# Patient Record
Sex: MALE | Race: WHITE | Employment: FULL TIME | URBAN - METROPOLITAN AREA
[De-identification: names, ages, dates, MRNs, and addresses within clinical notes are randomized per-mention and may not be internally consistent; named-entity substitution may affect disease eponyms.]

---

## 2020-03-14 ENCOUNTER — APPOINTMENT (OUTPATIENT)
Dept: GENERAL RADIOLOGY | Age: 60
DRG: 247 | End: 2020-03-14
Payer: COMMERCIAL

## 2020-03-14 ENCOUNTER — HOSPITAL ENCOUNTER (INPATIENT)
Age: 60
LOS: 2 days | Discharge: HOME | DRG: 247 | End: 2020-03-16
Attending: EMERGENCY MEDICINE | Admitting: INTERNAL MEDICINE
Payer: COMMERCIAL

## 2020-03-14 LAB
A/G RATIO: 1.1 (ref 1.1–2.2)
ALBUMIN SERPL-MCNC: 4 G/DL (ref 3.4–5)
ALP BLD-CCNC: 58 U/L (ref 40–129)
ALT SERPL-CCNC: 63 U/L (ref 10–40)
ANION GAP SERPL CALCULATED.3IONS-SCNC: 15 MMOL/L (ref 3–16)
APTT: 30.8 SEC (ref 24.2–36.2)
AST SERPL-CCNC: 254 U/L (ref 15–37)
BILIRUB SERPL-MCNC: 0.4 MG/DL (ref 0–1)
BUN BLDV-MCNC: 28 MG/DL (ref 7–20)
CALCIUM SERPL-MCNC: 9.6 MG/DL (ref 8.3–10.6)
CHLORIDE BLD-SCNC: 98 MMOL/L (ref 99–110)
CO2: 22 MMOL/L (ref 21–32)
CREAT SERPL-MCNC: 1.4 MG/DL (ref 0.9–1.3)
GFR AFRICAN AMERICAN: >60
GFR NON-AFRICAN AMERICAN: 52
GLOBULIN: 3.6 G/DL
GLUCOSE BLD-MCNC: 250 MG/DL (ref 70–99)
HCT VFR BLD CALC: 48.4 % (ref 40.5–52.5)
HEMOGLOBIN: 16.2 G/DL (ref 13.5–17.5)
INR BLD: 1.01 (ref 0.86–1.14)
LEFT VENTRICULAR EJECTION FRACTION HIGH VALUE: 35 %
MCH RBC QN AUTO: 29.5 PG (ref 26–34)
MCHC RBC AUTO-ENTMCNC: 33.5 G/DL (ref 31–36)
MCV RBC AUTO: 88.2 FL (ref 80–100)
PDW BLD-RTO: 14.7 % (ref 12.4–15.4)
PLATELET # BLD: 153 K/UL (ref 135–450)
PMV BLD AUTO: 8.3 FL (ref 5–10.5)
POTASSIUM REFLEX MAGNESIUM: 4.5 MMOL/L (ref 3.5–5.1)
PRO-BNP: 1278 PG/ML (ref 0–124)
PROTHROMBIN TIME: 11.7 SEC (ref 10–13.2)
RBC # BLD: 5.49 M/UL (ref 4.2–5.9)
SODIUM BLD-SCNC: 135 MMOL/L (ref 136–145)
TOTAL PROTEIN: 7.6 G/DL (ref 6.4–8.2)
TROPONIN: 2.61 NG/ML
WBC # BLD: 10.6 K/UL (ref 4–11)

## 2020-03-14 PROCEDURE — 85730 THROMBOPLASTIN TIME PARTIAL: CPT

## 2020-03-14 PROCEDURE — 80053 COMPREHEN METABOLIC PANEL: CPT

## 2020-03-14 PROCEDURE — C1887 CATHETER, GUIDING: HCPCS

## 2020-03-14 PROCEDURE — 84484 ASSAY OF TROPONIN QUANT: CPT

## 2020-03-14 PROCEDURE — 6370000000 HC RX 637 (ALT 250 FOR IP): Performed by: EMERGENCY MEDICINE

## 2020-03-14 PROCEDURE — 71045 X-RAY EXAM CHEST 1 VIEW: CPT

## 2020-03-14 PROCEDURE — 83880 ASSAY OF NATRIURETIC PEPTIDE: CPT

## 2020-03-14 PROCEDURE — 99152 MOD SED SAME PHYS/QHP 5/>YRS: CPT

## 2020-03-14 PROCEDURE — 93458 L HRT ARTERY/VENTRICLE ANGIO: CPT

## 2020-03-14 PROCEDURE — 92978 ENDOLUMINL IVUS OCT C 1ST: CPT

## 2020-03-14 PROCEDURE — 99153 MOD SED SAME PHYS/QHP EA: CPT

## 2020-03-14 PROCEDURE — C1753 CATH, INTRAVAS ULTRASOUND: HCPCS

## 2020-03-14 PROCEDURE — 99291 CRITICAL CARE FIRST HOUR: CPT | Performed by: INTERNAL MEDICINE

## 2020-03-14 PROCEDURE — 6360000002 HC RX W HCPCS: Performed by: INTERNAL MEDICINE

## 2020-03-14 PROCEDURE — 2500000003 HC RX 250 WO HCPCS

## 2020-03-14 PROCEDURE — C1769 GUIDE WIRE: HCPCS

## 2020-03-14 PROCEDURE — 6360000002 HC RX W HCPCS: Performed by: EMERGENCY MEDICINE

## 2020-03-14 PROCEDURE — 99291 CRITICAL CARE FIRST HOUR: CPT

## 2020-03-14 PROCEDURE — 92941 PRQ TRLML REVSC TOT OCCL AMI: CPT

## 2020-03-14 PROCEDURE — 93005 ELECTROCARDIOGRAM TRACING: CPT | Performed by: INTERNAL MEDICINE

## 2020-03-14 PROCEDURE — 6360000002 HC RX W HCPCS

## 2020-03-14 PROCEDURE — 85347 COAGULATION TIME ACTIVATED: CPT

## 2020-03-14 PROCEDURE — 2709999900 HC NON-CHARGEABLE SUPPLY

## 2020-03-14 PROCEDURE — 85027 COMPLETE CBC AUTOMATED: CPT

## 2020-03-14 PROCEDURE — 36415 COLL VENOUS BLD VENIPUNCTURE: CPT

## 2020-03-14 PROCEDURE — 2100000000 HC CCU R&B

## 2020-03-14 PROCEDURE — C1894 INTRO/SHEATH, NON-LASER: HCPCS

## 2020-03-14 PROCEDURE — C1725 CATH, TRANSLUMIN NON-LASER: HCPCS

## 2020-03-14 PROCEDURE — 85610 PROTHROMBIN TIME: CPT

## 2020-03-14 PROCEDURE — C1874 STENT, COATED/COV W/DEL SYS: HCPCS

## 2020-03-14 RX ORDER — HEPARIN SODIUM 1000 [USP'U]/ML
INJECTION, SOLUTION INTRAVENOUS; SUBCUTANEOUS
Status: COMPLETED | OUTPATIENT
Start: 2020-03-14 | End: 2020-03-14

## 2020-03-14 RX ORDER — HEPARIN SODIUM 1000 [USP'U]/ML
1000 INJECTION, SOLUTION INTRAVENOUS; SUBCUTANEOUS ONCE
Status: DISCONTINUED | OUTPATIENT
Start: 2020-03-14 | End: 2020-03-14

## 2020-03-14 RX ORDER — HEPARIN SODIUM 10000 [USP'U]/100ML
1000 INJECTION, SOLUTION INTRAVENOUS CONTINUOUS
Status: DISCONTINUED | OUTPATIENT
Start: 2020-03-14 | End: 2020-03-15

## 2020-03-14 RX ORDER — FENTANYL CITRATE 50 UG/ML
INJECTION, SOLUTION INTRAMUSCULAR; INTRAVENOUS
Status: COMPLETED | OUTPATIENT
Start: 2020-03-14 | End: 2020-03-14

## 2020-03-14 RX ORDER — HEPARIN SODIUM 10000 [USP'U]/100ML
INJECTION, SOLUTION INTRAVENOUS
Status: DISCONTINUED
Start: 2020-03-14 | End: 2020-03-15

## 2020-03-14 RX ORDER — HEPARIN SODIUM 1000 [USP'U]/ML
2000 INJECTION, SOLUTION INTRAVENOUS; SUBCUTANEOUS PRN
Status: DISCONTINUED | OUTPATIENT
Start: 2020-03-14 | End: 2020-03-15

## 2020-03-14 RX ORDER — SODIUM CHLORIDE 9 MG/ML
INJECTION, SOLUTION INTRAVENOUS
Status: DISPENSED
Start: 2020-03-14 | End: 2020-03-15

## 2020-03-14 RX ORDER — HEPARIN SODIUM 1000 [USP'U]/ML
4000 INJECTION, SOLUTION INTRAVENOUS; SUBCUTANEOUS PRN
Status: DISCONTINUED | OUTPATIENT
Start: 2020-03-14 | End: 2020-03-15

## 2020-03-14 RX ORDER — HEPARIN SODIUM 1000 [USP'U]/ML
4000 INJECTION, SOLUTION INTRAVENOUS; SUBCUTANEOUS ONCE
Status: COMPLETED | OUTPATIENT
Start: 2020-03-14 | End: 2020-03-14

## 2020-03-14 RX ORDER — MIDAZOLAM HYDROCHLORIDE 5 MG/ML
INJECTION INTRAMUSCULAR; INTRAVENOUS
Status: COMPLETED | OUTPATIENT
Start: 2020-03-14 | End: 2020-03-14

## 2020-03-14 RX ORDER — HEPARIN SODIUM 1000 [USP'U]/ML
1000 INJECTION, SOLUTION INTRAVENOUS; SUBCUTANEOUS ONCE
Status: COMPLETED | OUTPATIENT
Start: 2020-03-14 | End: 2020-03-14

## 2020-03-14 RX ADMIN — HEPARIN SODIUM 1000 UNITS: 1000 INJECTION INTRAVENOUS; SUBCUTANEOUS at 22:40

## 2020-03-14 RX ADMIN — FENTANYL CITRATE 50 MCG: 50 INJECTION INTRAMUSCULAR; INTRAVENOUS at 23:27

## 2020-03-14 RX ADMIN — HEPARIN SODIUM AND DEXTROSE 1000 UNITS/HR: 10000; 5 INJECTION INTRAVENOUS at 22:44

## 2020-03-14 RX ADMIN — TIROFIBAN 0.15 MCG/KG/MIN: 5 INJECTION, SOLUTION INTRAVENOUS at 23:40

## 2020-03-14 RX ADMIN — MIDAZOLAM HYDROCHLORIDE 2 MG: 5 INJECTION, SOLUTION INTRAMUSCULAR; INTRAVENOUS at 23:30

## 2020-03-14 RX ADMIN — HEPARIN SODIUM 6800 UNITS: 1000 INJECTION, SOLUTION INTRAVENOUS; SUBCUTANEOUS at 23:30

## 2020-03-14 RX ADMIN — TICAGRELOR 180 MG: 90 TABLET ORAL at 20:58

## 2020-03-14 RX ADMIN — HEPARIN SODIUM 4000 UNITS: 1000 INJECTION, SOLUTION INTRAVENOUS; SUBCUTANEOUS at 21:00

## 2020-03-14 ASSESSMENT — PAIN SCALES - GENERAL: PAINLEVEL_OUTOF10: 2

## 2020-03-14 ASSESSMENT — PAIN DESCRIPTION - PAIN TYPE: TYPE: ACUTE PAIN

## 2020-03-14 ASSESSMENT — PAIN DESCRIPTION - LOCATION: LOCATION: CHEST

## 2020-03-15 PROBLEM — I25.119 CORONARY ARTERY DISEASE INVOLVING NATIVE CORONARY ARTERY OF NATIVE HEART WITH ANGINA PECTORIS (HCC): Status: ACTIVE | Noted: 2020-03-15

## 2020-03-15 LAB
EKG ATRIAL RATE: 104 BPM
EKG DIAGNOSIS: NORMAL
EKG P AXIS: 46 DEGREES
EKG P-R INTERVAL: 168 MS
EKG Q-T INTERVAL: 310 MS
EKG QRS DURATION: 78 MS
EKG QTC CALCULATION (BAZETT): 407 MS
EKG R AXIS: -42 DEGREES
EKG T AXIS: 40 DEGREES
EKG VENTRICULAR RATE: 104 BPM
GLUCOSE BLD-MCNC: 274 MG/DL (ref 70–99)
GLUCOSE BLD-MCNC: 299 MG/DL (ref 70–99)
GLUCOSE BLD-MCNC: 308 MG/DL (ref 70–99)
HCT VFR BLD CALC: 43.5 % (ref 40.5–52.5)
HEMOGLOBIN: 14.4 G/DL (ref 13.5–17.5)
MCH RBC QN AUTO: 29 PG (ref 26–34)
MCHC RBC AUTO-ENTMCNC: 33.2 G/DL (ref 31–36)
MCV RBC AUTO: 87.5 FL (ref 80–100)
PDW BLD-RTO: 14.8 % (ref 12.4–15.4)
PERFORMED ON: ABNORMAL
PLATELET # BLD: 148 K/UL (ref 135–450)
PMV BLD AUTO: 8 FL (ref 5–10.5)
RBC # BLD: 4.97 M/UL (ref 4.2–5.9)
WBC # BLD: 11.3 K/UL (ref 4–11)

## 2020-03-15 PROCEDURE — B2151ZZ FLUOROSCOPY OF LEFT HEART USING LOW OSMOLAR CONTRAST: ICD-10-PCS | Performed by: INTERNAL MEDICINE

## 2020-03-15 PROCEDURE — 2580000003 HC RX 258: Performed by: INTERNAL MEDICINE

## 2020-03-15 PROCEDURE — 85347 COAGULATION TIME ACTIVATED: CPT

## 2020-03-15 PROCEDURE — 36415 COLL VENOUS BLD VENIPUNCTURE: CPT

## 2020-03-15 PROCEDURE — 83036 HEMOGLOBIN GLYCOSYLATED A1C: CPT

## 2020-03-15 PROCEDURE — 85027 COMPLETE CBC AUTOMATED: CPT

## 2020-03-15 PROCEDURE — 76937 US GUIDE VASCULAR ACCESS: CPT | Performed by: INTERNAL MEDICINE

## 2020-03-15 PROCEDURE — 92941 PRQ TRLML REVSC TOT OCCL AMI: CPT | Performed by: INTERNAL MEDICINE

## 2020-03-15 PROCEDURE — 93010 ELECTROCARDIOGRAM REPORT: CPT | Performed by: INTERNAL MEDICINE

## 2020-03-15 PROCEDURE — 99233 SBSQ HOSP IP/OBS HIGH 50: CPT | Performed by: INTERNAL MEDICINE

## 2020-03-15 PROCEDURE — 6360000004 HC RX CONTRAST MEDICATION

## 2020-03-15 PROCEDURE — 6370000000 HC RX 637 (ALT 250 FOR IP): Performed by: INTERNAL MEDICINE

## 2020-03-15 PROCEDURE — 93458 L HRT ARTERY/VENTRICLE ANGIO: CPT | Performed by: INTERNAL MEDICINE

## 2020-03-15 PROCEDURE — B2111ZZ FLUOROSCOPY OF MULTIPLE CORONARY ARTERIES USING LOW OSMOLAR CONTRAST: ICD-10-PCS | Performed by: INTERNAL MEDICINE

## 2020-03-15 PROCEDURE — 027135Z DILATION OF CORONARY ARTERY, TWO ARTERIES WITH TWO DRUG-ELUTING INTRALUMINAL DEVICES, PERCUTANEOUS APPROACH: ICD-10-PCS | Performed by: INTERNAL MEDICINE

## 2020-03-15 PROCEDURE — 92978 ENDOLUMINL IVUS OCT C 1ST: CPT | Performed by: INTERNAL MEDICINE

## 2020-03-15 PROCEDURE — 2100000000 HC CCU R&B

## 2020-03-15 PROCEDURE — 4A023N7 MEASUREMENT OF CARDIAC SAMPLING AND PRESSURE, LEFT HEART, PERCUTANEOUS APPROACH: ICD-10-PCS | Performed by: INTERNAL MEDICINE

## 2020-03-15 PROCEDURE — 6360000002 HC RX W HCPCS

## 2020-03-15 RX ORDER — ASPIRIN 81 MG/1
81 TABLET, CHEWABLE ORAL DAILY
Status: DISCONTINUED | OUTPATIENT
Start: 2020-03-16 | End: 2020-03-16 | Stop reason: HOSPADM

## 2020-03-15 RX ORDER — ACETAMINOPHEN 325 MG/1
650 TABLET ORAL EVERY 4 HOURS PRN
Status: DISCONTINUED | OUTPATIENT
Start: 2020-03-15 | End: 2020-03-16 | Stop reason: HOSPADM

## 2020-03-15 RX ORDER — CHLORAL HYDRATE 500 MG
1000 CAPSULE ORAL DAILY
Status: ON HOLD | COMMUNITY
Start: 2012-12-13 | End: 2020-03-16 | Stop reason: CLARIF

## 2020-03-15 RX ORDER — MIDAZOLAM HYDROCHLORIDE 5 MG/ML
INJECTION INTRAMUSCULAR; INTRAVENOUS
Status: COMPLETED | OUTPATIENT
Start: 2020-03-15 | End: 2020-03-15

## 2020-03-15 RX ORDER — SODIUM CHLORIDE 0.9 % (FLUSH) 0.9 %
10 SYRINGE (ML) INJECTION PRN
Status: DISCONTINUED | OUTPATIENT
Start: 2020-03-15 | End: 2020-03-16 | Stop reason: HOSPADM

## 2020-03-15 RX ORDER — HEPARIN SODIUM 1000 [USP'U]/ML
INJECTION, SOLUTION INTRAVENOUS; SUBCUTANEOUS
Status: COMPLETED | OUTPATIENT
Start: 2020-03-15 | End: 2020-03-15

## 2020-03-15 RX ORDER — LISINOPRIL 2.5 MG/1
2.5 TABLET ORAL DAILY
Status: DISCONTINUED | OUTPATIENT
Start: 2020-03-15 | End: 2020-03-16 | Stop reason: HOSPADM

## 2020-03-15 RX ORDER — FENTANYL CITRATE 50 UG/ML
INJECTION, SOLUTION INTRAMUSCULAR; INTRAVENOUS
Status: COMPLETED | OUTPATIENT
Start: 2020-03-15 | End: 2020-03-15

## 2020-03-15 RX ORDER — METFORMIN HYDROCHLORIDE 500 MG/1
500 TABLET, FILM COATED, EXTENDED RELEASE ORAL 2 TIMES DAILY WITH MEALS
COMMUNITY
Start: 2016-10-03

## 2020-03-15 RX ORDER — DEXTROSE MONOHYDRATE 25 G/50ML
12.5 INJECTION, SOLUTION INTRAVENOUS PRN
Status: DISCONTINUED | OUTPATIENT
Start: 2020-03-15 | End: 2020-03-16 | Stop reason: HOSPADM

## 2020-03-15 RX ORDER — ATORVASTATIN CALCIUM 80 MG/1
80 TABLET, FILM COATED ORAL NIGHTLY
Status: DISCONTINUED | OUTPATIENT
Start: 2020-03-15 | End: 2020-03-16 | Stop reason: HOSPADM

## 2020-03-15 RX ORDER — CARVEDILOL 3.12 MG/1
3.12 TABLET ORAL 2 TIMES DAILY WITH MEALS
Status: DISCONTINUED | OUTPATIENT
Start: 2020-03-15 | End: 2020-03-16 | Stop reason: HOSPADM

## 2020-03-15 RX ORDER — GLUCOSAMINE SULFATE 500 MG
500 CAPSULE ORAL DAILY
COMMUNITY

## 2020-03-15 RX ORDER — ASCORBIC ACID 250 MG
250 TABLET,CHEWABLE ORAL DAILY
COMMUNITY

## 2020-03-15 RX ORDER — BENAZEPRIL HYDROCHLORIDE 20 MG/1
20 TABLET ORAL DAILY
Status: ON HOLD | COMMUNITY
End: 2020-03-16 | Stop reason: HOSPADM

## 2020-03-15 RX ORDER — INSULIN GLARGINE 100 [IU]/ML
0.25 INJECTION, SOLUTION SUBCUTANEOUS NIGHTLY
Status: DISCONTINUED | OUTPATIENT
Start: 2020-03-15 | End: 2020-03-16 | Stop reason: HOSPADM

## 2020-03-15 RX ORDER — ONDANSETRON 2 MG/ML
4 INJECTION INTRAMUSCULAR; INTRAVENOUS EVERY 6 HOURS PRN
Status: DISCONTINUED | OUTPATIENT
Start: 2020-03-15 | End: 2020-03-16 | Stop reason: HOSPADM

## 2020-03-15 RX ORDER — DEXTROSE MONOHYDRATE 50 MG/ML
100 INJECTION, SOLUTION INTRAVENOUS PRN
Status: DISCONTINUED | OUTPATIENT
Start: 2020-03-15 | End: 2020-03-16 | Stop reason: HOSPADM

## 2020-03-15 RX ORDER — SODIUM CHLORIDE 0.9 % (FLUSH) 0.9 %
10 SYRINGE (ML) INJECTION EVERY 12 HOURS SCHEDULED
Status: DISCONTINUED | OUTPATIENT
Start: 2020-03-15 | End: 2020-03-16 | Stop reason: HOSPADM

## 2020-03-15 RX ORDER — NICOTINE POLACRILEX 4 MG
15 LOZENGE BUCCAL PRN
Status: DISCONTINUED | OUTPATIENT
Start: 2020-03-15 | End: 2020-03-16 | Stop reason: HOSPADM

## 2020-03-15 RX ADMIN — ACETAMINOPHEN 650 MG: 325 TABLET ORAL at 16:20

## 2020-03-15 RX ADMIN — Medication 10 ML: at 21:06

## 2020-03-15 RX ADMIN — ACETAMINOPHEN 650 MG: 325 TABLET ORAL at 10:46

## 2020-03-15 RX ADMIN — FENTANYL CITRATE 100 MCG: 50 INJECTION, SOLUTION INTRAMUSCULAR; INTRAVENOUS at 00:17

## 2020-03-15 RX ADMIN — MIDAZOLAM HYDROCHLORIDE 3 MG: 5 INJECTION INTRAMUSCULAR; INTRAVENOUS at 00:17

## 2020-03-15 RX ADMIN — TICAGRELOR 90 MG: 90 TABLET ORAL at 09:10

## 2020-03-15 RX ADMIN — INSULIN LISPRO 8 UNITS: 100 INJECTION, SOLUTION INTRAVENOUS; SUBCUTANEOUS at 11:26

## 2020-03-15 RX ADMIN — INSULIN GLARGINE 28 UNITS: 100 INJECTION, SOLUTION SUBCUTANEOUS at 21:04

## 2020-03-15 RX ADMIN — CARVEDILOL 3.12 MG: 3.12 TABLET, FILM COATED ORAL at 09:10

## 2020-03-15 RX ADMIN — HEPARIN SODIUM 3000 UNITS: 1000 INJECTION, SOLUTION INTRAVENOUS; SUBCUTANEOUS at 00:17

## 2020-03-15 RX ADMIN — INSULIN LISPRO 3 UNITS: 100 INJECTION, SOLUTION INTRAVENOUS; SUBCUTANEOUS at 21:06

## 2020-03-15 RX ADMIN — CARVEDILOL 3.12 MG: 3.12 TABLET, FILM COATED ORAL at 16:20

## 2020-03-15 RX ADMIN — LISINOPRIL 2.5 MG: 2.5 TABLET ORAL at 09:10

## 2020-03-15 RX ADMIN — TICAGRELOR 90 MG: 90 TABLET ORAL at 21:04

## 2020-03-15 RX ADMIN — TICAGRELOR 90 MG: 90 TABLET ORAL at 01:48

## 2020-03-15 RX ADMIN — Medication 10 ML: at 09:13

## 2020-03-15 RX ADMIN — INSULIN LISPRO 6 UNITS: 100 INJECTION, SOLUTION INTRAVENOUS; SUBCUTANEOUS at 16:34

## 2020-03-15 RX ADMIN — ACETAMINOPHEN 650 MG: 325 TABLET ORAL at 21:04

## 2020-03-15 RX ADMIN — ATORVASTATIN CALCIUM 80 MG: 80 TABLET, FILM COATED ORAL at 21:04

## 2020-03-15 RX ADMIN — HEPARIN SODIUM 3000 UNITS: 1000 INJECTION, SOLUTION INTRAVENOUS; SUBCUTANEOUS at 00:36

## 2020-03-15 ASSESSMENT — PAIN SCALES - GENERAL
PAINLEVEL_OUTOF10: 2
PAINLEVEL_OUTOF10: 3
PAINLEVEL_OUTOF10: 2
PAINLEVEL_OUTOF10: 2

## 2020-03-15 NOTE — PROGRESS NOTES
[address]   Progress Note  Cardiology      Chang Neves   Admission date:  3/14/2020  CC-f/up post MI  Subjective:  Ache in chest worse with a breath    Objective:  Medications/Labs all Reviewed     sodium chloride flush  10 mL Intravenous 2 times per day    carvedilol  3.125 mg Oral BID WC    lisinopril  2.5 mg Oral Daily    atorvastatin  80 mg Oral Nightly    [START ON 3/16/2020] aspirin  81 mg Oral Daily    ticagrelor  90 mg Oral BID       BMP:   Lab Results   Component Value Date     03/14/2020    K 4.5 03/14/2020    CL 98 03/14/2020    CO2 22 03/14/2020    BUN 28 03/14/2020    CREATININE 1.4 03/14/2020     CBC:    Lab Results   Component Value Date    WBC 11.3 03/15/2020    RBC 4.97 03/15/2020    HGB 14.4 03/15/2020    HCT 43.5 03/15/2020    MCV 87.5 03/15/2020    RDW 14.8 03/15/2020     03/15/2020      PT/INR:    Lab Results   Component Value Date    INR 1.01 03/14/2020    PROTIME 11.7 03/14/2020     Cardiac Enzymes:    Lab Results   Component Value Date    CKTOTAL 855 06/13/2012     Lab Results   Component Value Date    TROPONINI 2.61 ([facility]) 03/14/2020     BNP:  No results found for: BNP  FASTING LIPID PANEL:    Lab Results   Component Value Date    CHOL 177 06/13/2012    HDL 42 06/13/2012    TRIG 154 06/13/2012       Physical Examination:    /79   Pulse 102   Temp 98.8 °F (37.1 °C) (Oral)   Resp 20   Ht 5' 9\" (1.753 m)   Wt 250 lb (113.4 kg)   SpO2 97%   BMI 36.92 kg/m²      Respiratory:  · Resp Assessment: Normal respiratory effort  · Resp Auscultation: Clear to auscultation bilaterally   Cardiovascular:  · Auscultation: regular rate and rhythm, normal S1S2, no murmur, rub or gallop  · Palpation:  Nl PMI  · JVP:  normal  · Extremities: No Edema  Abdomen:  · Soft, non-tender  · Normal bowel sounds  Extremities:  ·  No Cyanosis or Clubbing  Neurological/Psychiatric:  · Oriented to time, place, and person  · Non-anxious  Skin Warm and dry  Good radial

## 2020-03-15 NOTE — OP NOTE
[address]   Procedure Note      Procedure: [facility]  Indication: Subacute STEMI  Consent: Verbal and/or written consent obtained prior to procedure. Sedation: Minimal conscious sedation utilized for comfort. Complic: None  EBL:  <08GL  Specimens: None  Fluoro:  15.9 min  Contrast: 205 cc  Access:  RRA  Ultrasound: Ultrasound guidance used to determine aforementioned artery patency, size (>2mm), anatomic variations and ideal puncture location. Real-time ultrasound utilized concurrent with vascular needle entry into the artery. Image(s) permanently recorded and reported in the patient chart. Findings:   LM Normal   LAD Ostial 80%, mid 100%   CX 20% mid  RCA 30% mid     LVG 35%, anteroapical hypokinesis   LVEDP 15mmHg    Intervention:   PCI of LAD with 2.5X38 Xience overlapping with more proximal [id number]  Distal PD with 2.75NC, Mid PD with 3.5 and prox PD with 4.0 (IVUS guided)    Post Cath Dx:   Severe 1vd as above    Med Rec:   Recommendation Indicated?  Not Given Due To: Note   DAPT  yes     STATIN - HIGH DOSE yes  Lipitor >40mg, Crestor >20mg   BETA BLOCKER yes     ACE/ARB/ARNI yes     ALDACTONE yes       Non-Med Rec:   Category Recommendation   EF ASSESSMENT Noninvasive assessment of EF if LVG deferred as IP/OP as appropriate   TOBACCO Avoidance of first and second hand smoke   DIET/EXERCISE Maintain healthy lifestyle with focus on diet, exercise and weight loss

## 2020-03-15 NOTE — PLAN OF CARE
Problem: Falls - Risk of:  Goal: Will remain free from falls  Description: Will remain free from falls  Outcome: Met This Shift  Goal: Absence of physical injury  Description: Absence of physical injury  Outcome: Met This Shift     Problem: Cardiac Output - Decreased:  Goal: Hemodynamic stability will improve  Description: Hemodynamic stability will improve  Outcome: Met This Shift     Problem:  Activity:  Goal: Ability to tolerate increased activity will improve  Description: Ability to tolerate increased activity will improve  Outcome: Met This Shift     Problem: Serum Glucose Level - Abnormal:  Goal: Ability to maintain appropriate glucose levels will improve  Description: Ability to maintain appropriate glucose levels will improve  Outcome: Met This Shift     Problem: Sensory Perception - Impaired:  Goal: Ability to maintain a stable neurologic state will improve  Description: Ability to maintain a stable neurologic state will improve  Outcome: Met This Shift     Problem: Pain:  Goal: Pain level will decrease  Description: Pain level will decrease  Outcome: Ongoing  Goal: Control of acute pain  Description: Control of acute pain  Outcome: Ongoing  Goal: Control of chronic pain  Description: Control of chronic pain  Outcome: Ongoing     Problem: Discharge Planning:  Goal: Discharged to appropriate level of care  Description: Discharged to appropriate level of care  Outcome: Ongoing

## 2020-03-15 NOTE — ED NOTES
Bed: 03  Expected date:   Expected time:   Means of arrival:   Comments:  vivian herrera 73 yo male     Susy Scheuermann, RN  03/14/20 2100

## 2020-03-15 NOTE — CONSULTS
[address] Consult/H+P  Interventional Cardiology/Structural Heart Disease    REASON FOR CONSULT/CHIEF COMPLAINT/HPI     RFC/CC cp   HPI Lori Jimenez is a 61 y. o.presents with cp. CP began yesterday at 10am.  Pain substernal, pressure, radiating to back, associated with sob. Pain lasted all day and 8-9/10. Pain much improved today and now 1-2/10. EKG with anterior TOMAS and Qs throughout the precordium. No labs available. HISTORY/ALLERGIES/ROS     MedHx:   has a past medical history of Allergic rhinitis, Arrhythmia, CKD (chronic kidney disease) stage 3, GFR 30-59 ml/min ([facility]), Diverticulitis, Diverticulitis, Fatigue, GERD (gastroesophageal reflux disease), Hyperlipidemia, Hypertension, Microalbuminuria, Osteoarthritis, Plantar fasciitis, Testosterone deficiency, Type 2 diabetes, uncontrolled, with renal manifestation ([address] and Vitamin D deficiency. SurgHx:  has a past surgical history that includes eye surgery; Appendectomy; and shoulder surgery (2006). SocHx:   reports that he has quit smoking. He has never used smokeless tobacco. He reports that he does not drink alcohol or use drugs. FamHx:  @[facility]@  Allergies: Patient has no known allergies. ROS:   [x]Full ROS obtained and negative except as mentioned in HPI    MEDICATIONS      Prior to Admission medications    Medication Sig Start Date End Date Taking? Authorizing Provider   Tuberculin-Allergy Syringes (B-D TB SYRINGE .5CC/27GX1/2\") 27G X 1/2\" 0.5 ML MISC by Does not apply route. 7/17/12   Mauricio Henry MD   testosterone cypionate (DEPOTESTOTERONE CYPIONATE) 200 MG/ML injection Use 100 mg (0.5ml) once a week IM   6/27/12   Mauricio Henry MD   vitamin D (ERGOCALCIFEROL) 60622 UNITS CAPS capsule Take 1 capsule by mouth once a week. 6/26/12 6/26/13  Mauricio Henry MD   fenofibrate micronized (LOFIBRA) 134 MG capsule Take 1 capsule by mouth every morning (before breakfast) for 360 days.  6/13/12 6/8/13 Ce London MD   benazepril (LOTENSIN) 20 MG tablet Take 1 tablet by mouth daily for 360 days. 6/13/12 6/8/13  Ce London MD   LANTUS SOLOSTAR 100 UNIT/ML injection Inject 40 Units into the skin 2 times daily. 3/16/12 3/16/13  Quiana Austin MD   Insulin Pen Needle (B-D ULTRAFINE III SHORT PEN) 31G X 8 MM MISC 1 each 5 times daily. 3/16/12   Quiana Austin MD   simvastatin (ZOCOR) 20 MG tablet Take 1 tablet by mouth nightly. 3/16/12 3/16/13  Quiana Austin MD   omega-3 acid ethyl esters (LOVAZA) 1 G capsule Take 2 capsules by mouth 2 times daily. 3/16/12   Quiana Austin MD   NOVOLOG FLEXPEN 100 UNIT/ML injection Use up to 60 units SQ in divided doses 3/16/12   Quiana Austin MD   S-Wsztyanmvdzf-R13-B6-B2 (CEREFOLIN) 6-1-50-5 MG TABS Take 1 tablet by mouth 3 times daily (with meals). 2/9/12   Quiana Austin MD   fluticasone (FLONASE) 50 MCG/ACT nasal spray 2 sprays by Nasal route as needed. 5/6/11   Historical Provider, MD   hydrocodone-acetaminophen (VICODIN) 5-500 MG per tablet Take 1 tablet by mouth as needed. 5/23/11   Historical Provider, MD   aspirin 81 MG tablet Take 81 mg by mouth daily.       Historical Provider, MD       PHYSICAL EXAM        Vitals:    03/14/20 2101   BP:    Pulse:    Resp:    Temp: 98.3 °F (36.8 °C)   SpO2:     Weight: 250 lb (113.4 kg)     Gen Alert, coop, no distress Heart  Rrr, no mrg   Head NC, AT, no abnorm Abd  Soft, NT, +BS, no mass, no OM   Eyes PERRLA, conj/corn clear Ext  Ext nl, AT, no C/C/E   Nose Nares nl, no drain, NT Pulse 2+ and symmetric   Throat Lips, mucosa, tongue nl Skin Color/text/turg nl, no rash/lesions   Neck S/S, TM, NT, no bruit/JVD Psych Nl mood and affect   Lung CTA-B, unlabored, no DTP Lymph   No cervical or axillary LA   Ch wall NT, no deform Neuro  Nl gross M/S exam     LABS     No labs available    ASSESSMENT AND PLAN     ~STEMI, subacute  Symptom timing and EKG suggests subacute MI more than 36 hours duration. Plan LHC, discussed in detail with patient, will proceed given mild ongoing pain   Load with brilinta and heparin  ~HTN  Controlled  Plan Continue antihypertensives  ~CHOL  On statin  Plan Continue statin    Critical Care   Due to the high probability of clinically significant life threating deterioration of the patient's condition that required my urgent intervention, a total critical care time of >35 minutes was used. This time excludes any time that may have been spent performing procedures. This includes but not limited to vital sign monitoring, telemetry monitoring, continuous pulse oximety, IV medication, clinical response to the IV medications, documentation time , consultation time, interpretation of lab data, review of nursing notes and old record review.      All questions and concerns were addressed to the patient/family. Alternatives to my treatment were discussed. The note was completed using EMR.  Every effort was made to ensure accuracy; however, inadvertent computerized transcription errors may be present.

## 2020-03-15 NOTE — ED PROVIDER NOTES
Inability: Not on file    Transportation needs     Medical: Not on file     Non-medical: Not on file   Tobacco Use    Smoking status: Former Smoker    Smokeless tobacco: Never Used   Substance and Sexual Activity    Alcohol use: No    Drug use: No    Sexual activity: Yes     Partners: Female   Lifestyle    Physical activity     Days per week: Not on file     Minutes per session: Not on file    Stress: Not on file   Relationships    Social connections     Talks on phone: Not on file     Gets together: Not on file     Attends Gnosticism service: Not on file     Active member of club or organization: Not on file     Attends meetings of clubs or organizations: Not on file     Relationship status: Not on file    Intimate partner violence     Fear of current or ex partner: Not on file     Emotionally abused: Not on file     Physically abused: Not on file     Forced sexual activity: Not on file   Other Topics Concern    Not on file   Social History Narrative    Not on file     Current Facility-Administered Medications   Medication Dose Route Frequency Provider Last Rate Last Dose    heparin (porcine) injection 4,000 Units  4,000 Units Intravenous PRN Jacki Santo MD        heparin (porcine) injection 2,000 Units  2,000 Units Intravenous PRN Jacki Santo MD        heparin 25,000 units in dextrose 5% 250 mL infusion  1,000 Units/hr Intravenous Continuous Jacki Santo MD         Current Outpatient Medications   Medication Sig Dispense Refill    Tuberculin-Allergy Syringes (B-D TB SYRINGE .5CC/27GX1/2\") 27G X 1/2\" 0.5 ML MISC by Does not apply route. 24 each 3    testosterone cypionate (DEPOTESTOTERONE CYPIONATE) 200 MG/ML injection Use 100 mg (0.5ml) once a week IM   3 mL 3    vitamin D (ERGOCALCIFEROL) 29140 UNITS CAPS capsule Take 1 capsule by mouth once a week. 12 capsule 3    fenofibrate micronized (LOFIBRA) 134 MG capsule Take 1 capsule by mouth every morning (before breakfast) for 360 days.  80 course, the patient was given:  Medications   heparin (porcine) injection 4,000 Units (has no administration in time range)   heparin (porcine) injection 2,000 Units (has no administration in time range)   heparin 25,000 units in dextrose 5% 250 mL infusion (has no administration in time range)   ticagrelor (BRILINTA) tablet 180 mg (180 mg Oral Given 3/14/20 2058)   heparin (porcine) injection 4,000 Units (4,000 Units Intravenous Given 3/14/20 2100)        All questions were answered and the patient/family expressed understanding and agreement with the plan. PROCEDURES  None    CRITICAL CARE  CRITICAL CARE TIME:  Total critical care time provided today was at least 31 minutes. This excludes seperately billable procedures. Critical care time was provided for STEMI that required close evaluation and/or intervention with concern for potential patient decompensation. CLINICAL IMPRESSION  1. ST elevation myocardial infarction (STEMI), unspecified artery ([facility] [address]        DISPOSITION   Admit / Cath lab    Condition: stable    Dejan Medrano MD    Note: This chart was created using voice recognition dictation software. Efforts were made by me to ensure accuracy, however some errors may be present due to limitations of this technology and occasionally words are not transcribed correctly.        Dejan Medrano MD  03/14/20 [id number]

## 2020-03-16 VITALS
DIASTOLIC BLOOD PRESSURE: 80 MMHG | OXYGEN SATURATION: 98 % | RESPIRATION RATE: 20 BRPM | HEART RATE: 100 BPM | WEIGHT: 250 LBS | BODY MASS INDEX: 37.03 KG/M2 | SYSTOLIC BLOOD PRESSURE: 128 MMHG | TEMPERATURE: 98.2 F | HEIGHT: 69 IN

## 2020-03-16 PROBLEM — I21.3 STEMI (ST ELEVATION MYOCARDIAL INFARCTION) (HCC): Status: ACTIVE | Noted: 2020-03-16

## 2020-03-16 LAB
ANION GAP SERPL CALCULATED.3IONS-SCNC: 13 MMOL/L (ref 3–16)
BUN BLDV-MCNC: 29 MG/DL (ref 7–20)
CALCIUM SERPL-MCNC: 8.8 MG/DL (ref 8.3–10.6)
CHLORIDE BLD-SCNC: 100 MMOL/L (ref 99–110)
CHOLESTEROL, TOTAL: 142 MG/DL (ref 0–199)
CO2: 22 MMOL/L (ref 21–32)
CREAT SERPL-MCNC: 1.3 MG/DL (ref 0.9–1.3)
ESTIMATED AVERAGE GLUCOSE: 197.3 MG/DL
GFR AFRICAN AMERICAN: >60
GFR NON-AFRICAN AMERICAN: 56
GLUCOSE BLD-MCNC: 200 MG/DL (ref 70–99)
GLUCOSE BLD-MCNC: 241 MG/DL (ref 70–99)
GLUCOSE BLD-MCNC: 307 MG/DL (ref 70–99)
GLUCOSE BLD-MCNC: 323 MG/DL (ref 70–99)
HBA1C MFR BLD: 8.5 %
HCT VFR BLD CALC: 40.8 % (ref 40.5–52.5)
HDLC SERPL-MCNC: 47 MG/DL (ref 40–60)
HEMOGLOBIN: 13.7 G/DL (ref 13.5–17.5)
LDL CHOLESTEROL CALCULATED: 65 MG/DL
LV EF: 35 %
LVEF MODALITY: NORMAL
MCH RBC QN AUTO: 29.6 PG (ref 26–34)
MCHC RBC AUTO-ENTMCNC: 33.7 G/DL (ref 31–36)
MCV RBC AUTO: 87.9 FL (ref 80–100)
PDW BLD-RTO: 14.4 % (ref 12.4–15.4)
PERFORMED ON: ABNORMAL
PLATELET # BLD: 134 K/UL (ref 135–450)
PMV BLD AUTO: 8.1 FL (ref 5–10.5)
POTASSIUM SERPL-SCNC: 4.5 MMOL/L (ref 3.5–5.1)
RBC # BLD: 4.64 M/UL (ref 4.2–5.9)
SODIUM BLD-SCNC: 135 MMOL/L (ref 136–145)
TRIGL SERPL-MCNC: 149 MG/DL (ref 0–150)
VLDLC SERPL CALC-MCNC: 30 MG/DL
WBC # BLD: 10.5 K/UL (ref 4–11)

## 2020-03-16 PROCEDURE — 2580000003 HC RX 258: Performed by: INTERNAL MEDICINE

## 2020-03-16 PROCEDURE — 99239 HOSP IP/OBS DSCHRG MGMT >30: CPT | Performed by: NURSE PRACTITIONER

## 2020-03-16 PROCEDURE — 6370000000 HC RX 637 (ALT 250 FOR IP): Performed by: INTERNAL MEDICINE

## 2020-03-16 PROCEDURE — 80048 BASIC METABOLIC PNL TOTAL CA: CPT

## 2020-03-16 PROCEDURE — 80061 LIPID PANEL: CPT

## 2020-03-16 PROCEDURE — C8929 TTE W OR WO FOL WCON,DOPPLER: HCPCS

## 2020-03-16 PROCEDURE — 36415 COLL VENOUS BLD VENIPUNCTURE: CPT

## 2020-03-16 PROCEDURE — 6360000004 HC RX CONTRAST MEDICATION: Performed by: INTERNAL MEDICINE

## 2020-03-16 PROCEDURE — 85027 COMPLETE CBC AUTOMATED: CPT

## 2020-03-16 RX ORDER — ATORVASTATIN CALCIUM 80 MG/1
80 TABLET, FILM COATED ORAL NIGHTLY
Qty: 30 TABLET | Refills: 3 | Status: SHIPPED | OUTPATIENT
Start: 2020-03-16 | End: 2020-04-08 | Stop reason: SDUPTHER

## 2020-03-16 RX ORDER — VALSARTAN 80 MG/1
40 TABLET ORAL DAILY
Qty: 30 TABLET | Refills: 3 | Status: SHIPPED | OUTPATIENT
Start: 2020-03-16 | End: 2020-03-24

## 2020-03-16 RX ORDER — SIMVASTATIN 40 MG
40 TABLET ORAL NIGHTLY
Status: ON HOLD | COMMUNITY
End: 2020-03-16 | Stop reason: HOSPADM

## 2020-03-16 RX ORDER — CARVEDILOL 6.25 MG/1
6.25 TABLET ORAL 2 TIMES DAILY WITH MEALS
Qty: 60 TABLET | Refills: 2 | Status: SHIPPED | OUTPATIENT
Start: 2020-03-16 | End: 2020-04-01

## 2020-03-16 RX ORDER — BENAZEPRIL HYDROCHLORIDE 40 MG/1
40 TABLET, FILM COATED ORAL DAILY
Status: ON HOLD | COMMUNITY
End: 2020-03-16 | Stop reason: HOSPADM

## 2020-03-16 RX ADMIN — TICAGRELOR 90 MG: 90 TABLET ORAL at 08:58

## 2020-03-16 RX ADMIN — CARVEDILOL 3.12 MG: 3.12 TABLET, FILM COATED ORAL at 08:15

## 2020-03-16 RX ADMIN — LISINOPRIL 2.5 MG: 2.5 TABLET ORAL at 08:58

## 2020-03-16 RX ADMIN — INSULIN LISPRO 4 UNITS: 100 INJECTION, SOLUTION INTRAVENOUS; SUBCUTANEOUS at 08:15

## 2020-03-16 RX ADMIN — Medication 10 ML: at 07:47

## 2020-03-16 RX ADMIN — ASPIRIN 81 MG 81 MG: 81 TABLET ORAL at 08:58

## 2020-03-16 RX ADMIN — PERFLUTREN 1.65 MG: 6.52 INJECTION, SUSPENSION INTRAVENOUS at 07:43

## 2020-03-16 RX ADMIN — ACETAMINOPHEN 650 MG: 325 TABLET ORAL at 02:24

## 2020-03-16 RX ADMIN — INSULIN LISPRO 8 UNITS: 100 INJECTION, SOLUTION INTRAVENOUS; SUBCUTANEOUS at 16:04

## 2020-03-16 RX ADMIN — INSULIN LISPRO 8 UNITS: 100 INJECTION, SOLUTION INTRAVENOUS; SUBCUTANEOUS at 12:19

## 2020-03-16 RX ADMIN — ACETAMINOPHEN 650 MG: 325 TABLET ORAL at 09:11

## 2020-03-16 RX ADMIN — MUPIROCIN: 20 OINTMENT TOPICAL at 08:58

## 2020-03-16 ASSESSMENT — PAIN DESCRIPTION - PAIN TYPE: TYPE: ACUTE PAIN

## 2020-03-16 ASSESSMENT — PAIN SCALES - GENERAL
PAINLEVEL_OUTOF10: 1
PAINLEVEL_OUTOF10: 2
PAINLEVEL_OUTOF10: 2
PAINLEVEL_OUTOF10: 1

## 2020-03-16 ASSESSMENT — PAIN DESCRIPTION - LOCATION: LOCATION: CHEST

## 2020-03-16 NOTE — PLAN OF CARE
Pt to remain free of fall and/or physical injury. Bed check on and functioning properly. Safe sign visible at door. Preventative Mepilex in place. No signs or symptoms of skin breakdown/ deep tissue injury. Turn every 2 hours. Pressure reducing interventions for heels.

## 2020-03-16 NOTE — DISCHARGE INSTR - ACTIVITY
Advance your activity as tolerated. No lifting with the right arm until for another 3-4 days. Pace your activity. If you experience chest pain, shortness of breath, or fatigue, please stop and rest.  If the symptom(s) don't resolve within a few minutes, consider seeking direction from your doctor, cardiologist, or available Emergency Response Systems.

## 2020-03-16 NOTE — DISCHARGE SUMMARY
[address]   Daily Progress Note    Admit Date:  3/14/2020  HPI:    Chief Complaint   Patient presents with    Chest Pain     CP since yesterday, went away and came back, nausea intermittent         Interval history: Charis Cazares is being followed for STEMI. Admitted 3/14/20 emergently to the cath lab for sub-acute STEMI. S/P DEONTE to LAD x2.  Echo showed LVEF 35%. Subjective:  Mr. Heather Law feels good. Ambulating in the hallways. No arrhythmias noted during admission   No chest pain. No shortness of breath.      Objective:   /74   Pulse 115   Temp 98.8 °F (37.1 °C)   Resp 18   Ht 5' 9\" (1.753 m)   Wt 250 lb (113.4 kg)   SpO2 97%   BMI 36.92 kg/m²       Intake/Output Summary (Last 24 hours) at 3/16/2020 1327  Last data filed at 3/15/2020 1810  Gross per 24 hour   Intake 240 ml   Output --   Net 240 ml       NYHA: II    Physical Exam:  General:  Awake, alert, NAD  Skin:  Warm and dry  Neck:  JVD<8  Chest:  Clear to auscultation, no wheezes/rhonchi/rales  Cardiovascular:  Tachy rate, reg rhtyhm, S1S2, no m/r/g   Abdomen:  Soft, nontender, +bowel sounds  Extremities:  No ble  Edema, right wrist without     Medications:    mupirocin   Nasal BID    sodium chloride flush  10 mL Intravenous 2 times per day    carvedilol  3.125 mg Oral BID WC    lisinopril  2.5 mg Oral Daily    atorvastatin  80 mg Oral Nightly    aspirin  81 mg Oral Daily    ticagrelor  90 mg Oral BID    insulin glargine  0.25 Units/kg Subcutaneous Nightly    insulin lispro  0-12 Units Subcutaneous TID WC    insulin lispro  0-6 Units Subcutaneous Nightly      dextrose         Lab Data:  CBC:   Recent Labs     03/14/20  2057 03/15/20  0635 03/16/20 0417   WBC 10.6 11.3* 10.5   HGB 16.2 14.4 13.7    148 134*     BMP:    Recent Labs     03/14/20 2057 03/16/20 0417   * 135*   K 4.5 4.5   CO2 22 22   BUN 28* 29*   CREATININE 1.4* 1.3     INR:    Recent Labs     03/14/20 2057   INR 1.01     BNP: Recent Labs     03/14/20 2057   PROBNP 1,278*     No results found for: LVEF, LVEFMODE    Cardiac cath 3/15/20  Findings:               LM         Normal     LAD       Ostial 80%, mid 100%         CX         20% mid  RCA       30% mid                                               LVG       35%, anteroapical hypokinesis           LVEDP  15mmHg     Intervention:         PCI of LAD with 2.5X38 Xience overlapping with more proximal [id number]  Distal PD with 2.75NC, Mid PD with 3.5 and prox PD with 4.0 (IVUS guided)      [facility] 3/16/20   Summary   The left ventricular systolic function is moderately reduced with an   ejection fraction of 35 %. Hypokinesis of apical inferior,apical   septal,apical anterior and mid anteroseptal walls. Normal left ventricular   size with mild concentric left ventricular hypertrophy.   Grade I diastolic dysfunction with normal filling pressure.   Mild thickening of leaflets of mitral valve.   No mitral stenosis.   Mild mitral regurgitation.   No intracardiac thrombus. Definity echo contrast was used. Principal Problem:    ST elevation myocardial infarction (STEMI) ([address]  Active Problems:    Type 2 diabetes, uncontrolled, with renal manifestation (HCC)    Hyperlipidemia    Coronary artery disease involving native coronary artery of native heart with angina pectoris ([address]  Resolved Problems:    * No resolved hospital problems.  *      Assessment/Plan:  ~STEMI; s/p DEONTE x2 in LAD  ~HLD  ~HTN  ~CAD  ~Ischemic cardiomyopathy  ~DM    Plan:  Aspirin and brilinta  D/c lininopril and start valsartan 40mg daily in place of home benazepril   Adjust coreg 6.25mg BID  Continue home diabetic meds including home farxiga   Continue lipitor 80mg daily     Okay for discharge; off of work for the next 2 weeks   Follow up in 2 weeks with repeat labs    Required to place admit order at the time of discharge due to unable to complete discharge medication rec; therefore the time of admission on the admit order and the discharge order will appear the same.      Condition at discharge: good  Discharged to: [id number] Frank CNP, 3/16/2020, 3:25 PM

## 2020-03-16 NOTE — PROGRESS NOTES
Pt discharged to home with pt's spouse. Reviewed discharge paperwork and medications with pt and spouse at bedside. Pt verbalized understanding of same along with scheduled follow up appointment. Pt to POV via wheelchair without event.

## 2020-03-16 NOTE — CARE COORDINATION
CM meeting with patient at bedside when discharge orders received. Patient normally independent and denies needs.  Patient has been ambulating freely in halls   [facility], [facility]

## 2020-03-16 NOTE — PLAN OF CARE
Problem: Falls - Risk of:  Goal: Will remain free from falls  Description: Will remain free from falls  3/16/2020 1130 by Artis Liao RN  Outcome: Ongoing    Problem: Pain:  Goal: Control of acute pain  Description: Control of acute pain  3/16/2020 1130 by Artis Liao RN  Outcome: Ongoing     Problem: Cardiac Output - Decreased:  Goal: Hemodynamic stability will improve  Description: Hemodynamic stability will improve  3/16/2020 1130 by Artis Liao RN  Outcome: Ongoing     Problem:  Activity:  Goal: Ability to tolerate increased activity will improve  Description: Ability to tolerate increased activity will improve  3/16/2020 1130 by Artis Liao RN  Outcome: Ongoing     Problem: Discharge Planning:  Goal: Discharged to appropriate level of care  Description: Discharged to appropriate level of care  3/16/2020 1130 by Artis Liao RN  Outcome: Ongoing     Problem: Serum Glucose Level - Abnormal:  Goal: Ability to maintain appropriate glucose levels will improve  Description: Ability to maintain appropriate glucose levels will improve  3/16/2020 1130 by Artis Liao RN  Outcome: Ongoing      Problem: Pain:  Goal: Control of acute pain  Description: Control of acute pain  3/16/2020 1130 by Artis Liao RN  Outcome: Ongoing

## 2020-03-17 LAB
POC ACT LR: 151 SEC
POC ACT LR: 224 SEC
POC ACT LR: 226 SEC

## 2020-03-24 ENCOUNTER — TELEPHONE (OUTPATIENT)
Dept: CARDIOLOGY | Age: 60
End: 2020-03-24

## 2020-03-24 ENCOUNTER — OFFICE VISIT (OUTPATIENT)
Dept: CARDIOLOGY CLINIC | Age: 60
End: 2020-03-24
Payer: COMMERCIAL

## 2020-03-24 VITALS
HEART RATE: 87 BPM | SYSTOLIC BLOOD PRESSURE: 110 MMHG | DIASTOLIC BLOOD PRESSURE: 76 MMHG | OXYGEN SATURATION: 96 % | HEIGHT: 69 IN | WEIGHT: 260 LBS | BODY MASS INDEX: 38.51 KG/M2

## 2020-03-24 PROCEDURE — 99215 OFFICE O/P EST HI 40 MIN: CPT | Performed by: NURSE PRACTITIONER

## 2020-03-24 PROCEDURE — 93000 ELECTROCARDIOGRAM COMPLETE: CPT | Performed by: NURSE PRACTITIONER

## 2020-03-24 RX ORDER — VALSARTAN 80 MG/1
80 TABLET ORAL DAILY
Qty: 30 TABLET | Refills: 3 | Status: SHIPPED | OUTPATIENT
Start: 2020-03-24 | End: 2020-06-01

## 2020-03-24 RX ORDER — FUROSEMIDE 20 MG/1
20 TABLET ORAL DAILY
Qty: 90 TABLET | Refills: 1 | Status: SHIPPED | OUTPATIENT
Start: 2020-03-24 | End: 2020-04-14 | Stop reason: SDUPTHER

## 2020-03-24 NOTE — LETTER
[facility]   Cardiac Follow-up    Primary Care Doctor:  [facility]ee[facility] - [facility]    Chief Complaint   Patient presents with    Follow-Up from Hospital    Shortness of Breath    Fatigue        History of Present Illness:   I had the pleasure of seeing Angelito Mason in follow up for Hospital follow up. Recently admitted 3/14/20-3/16/20 with STEMI. S/p DEONTE to LAD x2. Echocardiogram shows LVEF down to 35%. Started on Valsartan 40mg daily, coreg adjusted to 6.25mg BID and discharged on Brilinta and aspirin. Patient called today complaining of worsening shortness of breath. He has been having progressively worsening shortness of breath with trying to sleep. getting orthopnea, improves with sitting up. Having to sleep in a recliner- new for him. Feels like he is smothering and someone has closed up his wind pipe. Breathing doesn't improve with pursed lip breathing. Also coughing, dry to clear sputum at times. He has trying to be acting and Walking until he gets shortness of breath. Was able to was 3/4 of a mile yesterday. Not using as much Pepcid AC as before, no chest pain. Angelito Mason describes symptoms including dyspnea, orthopnea but denies chest pain, palpitations, edema, syncope. Home weights: doesn't have a scale    Past Medical History:   has a past medical history of Allergic rhinitis, Arrhythmia, CKD (chronic kidney disease) stage 3, GFR 30-59 ml/min ([facility]), Coronary artery disease involving native coronary artery of native heart with angina pectoris ([address] Diverticulitis, Diverticulitis, Fatigue, GERD (gastroesophageal reflux disease), Hyperlipidemia, Hypertension, Microalbuminuria, Osteoarthritis, Plantar fasciitis, Testosterone deficiency, Type 2 diabetes, uncontrolled, with renal manifestation ([address] and Vitamin D deficiency. Surgical History:   has a past surgical history that includes eye surgery; Appendectomy; and shoulder surgery (2006).    Social History: Recent Testing:  Cardiac cath 3/15/20  Findings:               LM         Normal     LAD       Ostial 80%, mid 100%         CX         20% mid  RCA       30% mid                                               LVG       35%, anteroapical hypokinesis           LVEDP  15mmHg     Intervention:         PCI of LAD with 2.5X38 Xience overlapping with more proximal [id number]  Distal PD with 2.75NC, Mid PD with 3.5 and prox PD with 4.0 (IVUS guided)        [facility] 3/16/20   Summary   The left ventricular systolic function is moderately reduced with an   ejection fraction of 35 %. Hypokinesis of apical inferior,apical   septal,apical anterior and mid anteroseptal walls. Normal left ventricular   size with mild concentric left ventricular hypertrophy.   Grade I diastolic dysfunction with normal filling pressure.   Mild thickening of leaflets of mitral valve.   No mitral stenosis.   Mild mitral regurgitation.   No intracardiac thrombus. Definity echo contrast was used.       NYHA:   III  ACC/ AHA Stage:    C    Pertinent Problems:  ~shortness of breath, orthopnea- likely due to pulmonary edema/pleural effusions from heart failure  ~Acute systolic heart failure. Recent STEMI, and drop in LVEF 35%. ~STEMI; s/p DEONTE x2 in LAD  ~HLD  ~HTN  ~CAD  ~Ischemic cardiomyopathy  ~DM       Visit Diagnosis:    1. Acute systolic heart failure ([address]    2. Pure hypercholesterolemia    3. Essential hypertension    4. Ischemic cardiomyopathy    5. Shortness of breath        Plan:   1. Obtain a scale,- check daily weights- 1st thing in the am after you go to the bathroom before you eat breakfast. Record your weights, if you gain 3lbs in a day or 5lbs in a week- call the office  2. Start you on lasix 20mg daily for now (water pill) may need to increase dose to 40mg if weight is increasing and more short of breath.    3. Adjust fluid intake to a goal of about 2L a day or 64 ounces a day- keep it consistent from day to day 4. Recommend you stay off of work until 4/6/2020  5. Check BMP and BNP next week - to monitor potassium level with medication changes. 6. Adjust valsartan to 80mg daily - this is for your heart failure  7. Check b/p at home if you experience lightheadedness or dizziness  8. Continue brilinta, aspirin, brilinta   9. Reviewed exercise   10. Follow up next week     QUALITY MEASURES  1. Tobacco Cessation Counseling: NA  2. Retake of BP if >140/90:   NA  3. Documentation to PCP/referring for new patient:  Sent to PCP at close of office visit  4. CAD patient on anti-platelet: Yes  5. CAD patient on STATIN therapy:  Yes  6. Patient with CHF and aFib on anticoagulation:  NA     I appreciate the opportunity for caring for this patient.      Kamini Ramon CNP, 3/24/2020, 3:05 PM

## 2020-03-24 NOTE — PATIENT INSTRUCTIONS
1. Obtain a scale,- check daily weights- 1st thing in the am after you go to the bathroom before you eat breakfast. Record your weights, if you gain 3lbs in a day or 5lbs in a week- call the office  2. Start you on lasix 20mg daily for now (water pill) may need to increase dose to 40mg if weight is increasing and more short of breath. 3. Adjust fluid intake to a goal of about 2L a day or 64 ounces a day- keep it consistent from day to day   4. Recommend you stay off of work until 4/6/2020   5. Check BMP and BNP next week -   6. Adjust valsartan to 80mg daily - this is for your heart failure  7. Check b/p at home if you experience lightheadedness or dizziness.   8. Follow up next week

## 2020-03-24 NOTE — TELEPHONE ENCOUNTER
Need to see him, please have him come in for appt today.      Also have his [facility] papers completed

## 2020-03-24 NOTE — PROGRESS NOTES
[facility] [place]   Cardiac Follow-up    Primary Care Doctor:  Leonie Mccormick [facility] - [facility]    Chief Complaint   Patient presents with    Follow-Up from Hospital    Shortness of Breath    Fatigue        History of Present Illness:   I had the pleasure of seeing Deniz Grier in follow up for Hospital follow up. Recently admitted 3/14/20-3/16/20 with STEMI. S/p DEONTE to LAD x2. Echocardiogram shows LVEF down to 35%. Started on Valsartan 40mg daily, coreg adjusted to 6.25mg BID and discharged on Brilinta and aspirin. Patient called today complaining of worsening shortness of breath. He has been having progressively worsening shortness of breath with trying to sleep. getting orthopnea, improves with sitting up. Having to sleep in a recliner- new for him. Feels like he is smothering and someone has closed up his wind pipe. Breathing doesn't improve with pursed lip breathing. Also coughing, dry to clear sputum at times. He has trying to be acting and Walking until he gets shortness of breath. Was able to was 3/4 of a mile yesterday. Not using as much Pepcid AC as before, no chest pain. Deniz Grier describes symptoms including dyspnea, orthopnea but denies chest pain, palpitations, edema, syncope. Home weights: doesn't have a scale    Past Medical History:   has a past medical history of Allergic rhinitis, Arrhythmia, CKD (chronic kidney disease) stage 3, GFR 30-59 ml/min ([facility]), Coronary artery disease involving native coronary artery of native heart with angina pectoris ([address] Diverticulitis, Diverticulitis, Fatigue, GERD (gastroesophageal reflux disease), Hyperlipidemia, Hypertension, Microalbuminuria, Osteoarthritis, Plantar fasciitis, Testosterone deficiency, Type 2 diabetes, uncontrolled, with renal manifestation ([address] and Vitamin D deficiency. Surgical History:   has a past surgical history that includes eye surgery; Appendectomy; and shoulder surgery (2006).    Social History:   reports that he has quit smoking. He has never used smokeless tobacco. He reports that he does not drink alcohol or use drugs. Family History:   Family History   Problem Relation Age of Onset    Glaucoma Mother     Heart Disease Father     Heart Attack Father     Cancer Maternal Grandmother     Cancer Maternal Grandfather        Home Medications:  Prior to Admission medications    Medication Sig Start Date End Date Taking? Authorizing Provider   atorvastatin (LIPITOR) 80 MG tablet Take 1 tablet by mouth nightly 3/16/20   Virgilio Chin APRN - CNP   carvedilol (COREG) 6.25 MG tablet Take 1 tablet by mouth 2 times daily (with meals) 3/16/20   Vigrilio Chin APRN - NAY   ticagrelor [facility]) 90 MG TABS tablet Take 1 tablet by mouth 2 times daily 3/16/20   MONA Canela - CNP   dapagliflozin [facility]) 5 MG tablet Take 1 tablet by mouth every morning 3/16/20   Virgilio Chin APRN - CNP   valsartan (DIOVAN) 80 MG tablet Take 0.5 tablets by mouth daily 3/16/20   Virgilio Chin APRN - CNP   insulin lispro protamine & lispro (HUMALOG MIX) (75-25) 100 UNIT per ML SUSP injection vial Inject 80 Units into the skin 2 times daily (with meals)    Historical Provider, MD   insulin lispro (HUMALOG) 100 UNIT/ML injection vial Inject 15 Units into the skin 3 times daily (before meals)    Historical Provider, MD   metFORMIN, MOD, (GLUMETZA) 500 MG extended release tablet Take 500 mg by mouth 2 times daily (with meals)  10/3/16   Historical Provider, MD   Ascorbic Acid (VITAMIN C) 250 MG CHEW Take 250 mg by mouth daily    Historical Provider, MD   Glucosamine 500 MG CAPS Take 500 mg by mouth daily    Historical Provider, MD   fenofibrate micronized (LOFIBRA) 134 MG capsule Take 1 capsule by mouth every morning (before breakfast) for 360 days. 6/13/12 6/8/13  Markus Broderick MD   Insulin Pen Needle (B-D ULTRAFINE III SHORT PEN) 31G X 8 MM MISC 1 each 5 times daily.  3/16/12   Rose Rosen MD   omega-3 acid ethyl Abnormalities Noted  Musculoskeletal/Skin:  · Exhibits normal gait balance and coordination  · There is no clubbing, cyanosis of the extremities  · Skin is warm and dry  · Moves all extremities well  Neurological/Psychiatric:  · Alert and oriented in all spheres  · No abnormalities of mood, affect, memory, mentation, or behavior are noted    Lab Data:  CBC:   Lab Results   Component Value Date    WBC 10.5 03/16/2020    WBC 11.3 03/15/2020    WBC 10.6 03/14/2020    RBC 4.64 03/16/2020    RBC 4.97 03/15/2020    RBC 5.49 03/14/2020    HGB 13.7 03/16/2020    HGB 14.4 03/15/2020    HGB 16.2 03/14/2020    HCT 40.8 03/16/2020    HCT 43.5 03/15/2020    HCT 48.4 03/14/2020    MCV 87.9 03/16/2020    MCV 87.5 03/15/2020    MCV 88.2 03/14/2020    RDW 14.4 03/16/2020    RDW 14.8 03/15/2020    RDW 14.7 03/14/2020     03/16/2020     03/15/2020     03/14/2020     Iron: No results found for: IRON, TIBC, FERRITIN  BMP:   Lab Results   Component Value Date     03/16/2020     03/14/2020     06/13/2012    K 4.5 03/16/2020    K 4.5 03/14/2020    K 4.5 06/13/2012    K 4.7 03/16/2012     03/16/2020    CL 98 03/14/2020     06/13/2012    CO2 22 03/16/2020    CO2 22 03/14/2020    CO2 21 06/13/2012    PHOS 3.3 08/11/2011    BUN 29 03/16/2020    BUN 28 03/14/2020    BUN 19 06/13/2012    CREATININE 1.3 03/16/2020    CREATININE 1.4 03/14/2020    CREATININE 1.43 06/13/2012     BNP:   Lab Results   Component Value Date    PROBNP 1,278 03/14/2020     Lipids:   Lab Results   Component Value Date    CHOL 142 03/16/2020        Lab Results   Component Value Date    TRIG 149 03/16/2020        Lab Results   Component Value Date    HDL 47 03/16/2020        Lab Results   Component Value Date    LDLCALC 65 03/16/2020        Lab Results   Component Value Date    LABVLDL 30 03/16/2020      No results found for: CHOLHDLRATIO    EF: No results found for: LVEF, LVEFMODE    Recent Testing:  Cardiac cath

## 2020-04-01 ENCOUNTER — OFFICE VISIT (OUTPATIENT)
Dept: CARDIOLOGY CLINIC | Age: 60
End: 2020-04-01
Payer: COMMERCIAL

## 2020-04-01 VITALS
HEART RATE: 101 BPM | SYSTOLIC BLOOD PRESSURE: 108 MMHG | BODY MASS INDEX: 38.06 KG/M2 | DIASTOLIC BLOOD PRESSURE: 74 MMHG | OXYGEN SATURATION: 94 % | WEIGHT: 257 LBS | HEIGHT: 69 IN

## 2020-04-01 PROBLEM — I25.10 CORONARY ARTERY DISEASE INVOLVING NATIVE CORONARY ARTERY OF NATIVE HEART WITHOUT ANGINA PECTORIS: Status: ACTIVE | Noted: 2020-03-15

## 2020-04-01 LAB
B-TYPE NATRIURETIC PEPTIDE: 164.1 PG/ML
BUN BLDV-MCNC: 30 MG/DL
CALCIUM SERPL-MCNC: 9.1 MG/DL
CHLORIDE BLD-SCNC: 102 MMOL/L
CO2: 20 MMOL/L
CREAT SERPL-MCNC: 1.62 MG/DL
GFR CALCULATED: 46
GLUCOSE BLD-MCNC: 160 MG/DL
POTASSIUM SERPL-SCNC: 4.7 MMOL/L
SODIUM BLD-SCNC: 142 MMOL/L

## 2020-04-01 PROCEDURE — 3052F HG A1C>EQUAL 8.0%<EQUAL 9.0%: CPT | Performed by: NURSE PRACTITIONER

## 2020-04-01 PROCEDURE — 99214 OFFICE O/P EST MOD 30 MIN: CPT | Performed by: NURSE PRACTITIONER

## 2020-04-01 RX ORDER — CARVEDILOL 6.25 MG/1
TABLET ORAL
Qty: 60 TABLET | Refills: 2
Start: 2020-04-01 | End: 2020-04-08 | Stop reason: SDUPTHER

## 2020-04-01 NOTE — LETTER
[address] Cardiology - [address]  Phone: [phone]  Fax: [phone]    MONA Huff CNP        April 1, 2020     Patient: Claudia Whitlock   YOB: 1960   Date of Visit: 4/1/2020       To Whom It May Concern: It is my medical opinion that Shahida Gonsales should remain out of work until 4/20/20 due to heart condition and COVID-19. If you have any questions or concerns, please don't hesitate to call.     Sincerely,        MONA Huff CNP

## 2020-04-01 NOTE — LETTER
[address]   Cardiac Follow-up    Primary Care Doctor:  [facility]    Chief Complaint   Patient presents with    Follow-up     1 wk    Fatigue    Shortness of Breath        History of Present Illness:   I had the pleasure of seeing Teddy Duverney in follow up for ischemic cardiomyopathy. Admitted 3/14/20-3/16/20 with STEMI. S/p DEONTE to LAD x2. Echocardiogram shows LVEF down to 35%. Since last visit, started on lasix. Valsartan was increased to 80mg daily. He is doing better. Checking weights, slowly coming down. He is able to sleep in bed since Saturday, orthopnea improving. Continues with shortness of breath with activity but starting to pace himself with the activity. Walking 1/2 mile daily on the flat, taking breaks. He has fatigue- ongoing   Having some cough with the changes with the temperature. Weights:   252.4  250.2  248.6  249.2  248.6  249.7  248. 8- today       Teddy Duverney describes symptoms including dyspnea, orthopnea but denies chest pain, palpitations, syncope. Past Medical History:   has a past medical history of Allergic rhinitis, Arrhythmia, CKD (chronic kidney disease) stage 3, GFR 30-59 ml/min ([facility]), Coronary artery disease involving native coronary artery of native heart with angina pectoris ([address] Diverticulitis, Diverticulitis, Fatigue, GERD (gastroesophageal reflux disease), Hyperlipidemia, Hypertension, Microalbuminuria, Osteoarthritis, Plantar fasciitis, Testosterone deficiency, Type 2 diabetes, uncontrolled, with renal manifestation ([address] and Vitamin D deficiency. Surgical History:   has a past surgical history that includes eye surgery; Appendectomy; and shoulder surgery (2006). Social History:   reports that he has quit smoking. He has never used smokeless tobacco. He reports that he does not drink alcohol or use drugs.    Family History:   Family History   Problem Relation Age of Onset    Glaucoma Mother · Liver: No Abnormalities Noted  Musculoskeletal/Skin:  · Exhibits normal gait balance and coordination  · There is no clubbing, cyanosis of the extremities  · Skin is warm and dry  · Moves all extremities well  Neurological/Psychiatric:  · Alert and oriented in all spheres  · No abnormalities of mood, affect, memory, mentation, or behavior are noted    Lab Data:  CBC:   Lab Results   Component Value Date    WBC 10.5 03/16/2020    WBC 11.3 03/15/2020    WBC 10.6 03/14/2020    RBC 4.64 03/16/2020    RBC 4.97 03/15/2020    RBC 5.49 03/14/2020    HGB 13.7 03/16/2020    HGB 14.4 03/15/2020    HGB 16.2 03/14/2020    HCT 40.8 03/16/2020    HCT 43.5 03/15/2020    HCT 48.4 03/14/2020    MCV 87.9 03/16/2020    MCV 87.5 03/15/2020    MCV 88.2 03/14/2020    RDW 14.4 03/16/2020    RDW 14.8 03/15/2020    RDW 14.7 03/14/2020     03/16/2020     03/15/2020     03/14/2020     Iron: No results found for: IRON, TIBC, FERRITIN  BMP:   Lab Results   Component Value Date     03/16/2020     03/14/2020     06/13/2012    K 4.5 03/16/2020    K 4.5 03/14/2020    K 4.5 06/13/2012    K 4.7 03/16/2012     03/16/2020    CL 98 03/14/2020     06/13/2012    CO2 22 03/16/2020    CO2 22 03/14/2020    CO2 21 06/13/2012    PHOS 3.3 08/11/2011    BUN 29 03/16/2020    BUN 28 03/14/2020    BUN 19 06/13/2012    CREATININE 1.3 03/16/2020    CREATININE 1.4 03/14/2020    CREATININE 1.43 06/13/2012     BNP:   Lab Results   Component Value Date    PROBNP 1,278 03/14/2020     Lipids:   Lab Results   Component Value Date    CHOL 142 03/16/2020        Lab Results   Component Value Date    TRIG 149 03/16/2020        Lab Results   Component Value Date    HDL 47 03/16/2020        Lab Results   Component Value Date    LDLCALC 65 03/16/2020        Lab Results   Component Value Date    LABVLDL 30 03/16/2020      No results found for: CHOLHDLRATIO    EF:   Lab Results   Component Value Date    LVEF 35 03/16/2020

## 2020-04-08 ENCOUNTER — PATIENT MESSAGE (OUTPATIENT)
Dept: CARDIOLOGY CLINIC | Age: 60
End: 2020-04-08

## 2020-04-08 RX ORDER — CARVEDILOL 6.25 MG/1
TABLET ORAL
Qty: 270 TABLET | Refills: 3 | Status: SHIPPED | OUTPATIENT
Start: 2020-04-08 | End: 2020-04-16

## 2020-04-08 RX ORDER — ATORVASTATIN CALCIUM 80 MG/1
80 TABLET, FILM COATED ORAL NIGHTLY
Qty: 90 TABLET | Refills: 3 | Status: SHIPPED | OUTPATIENT
Start: 2020-04-08 | End: 2020-07-21 | Stop reason: SDUPTHER

## 2020-04-16 ENCOUNTER — VIRTUAL VISIT (OUTPATIENT)
Dept: CARDIOLOGY CLINIC | Age: 60
End: 2020-04-16
Payer: COMMERCIAL

## 2020-04-16 VITALS
DIASTOLIC BLOOD PRESSURE: 76 MMHG | SYSTOLIC BLOOD PRESSURE: 128 MMHG | HEART RATE: 101 BPM | HEIGHT: 69 IN | BODY MASS INDEX: 36.94 KG/M2 | WEIGHT: 249.4 LBS

## 2020-04-16 PROCEDURE — 99214 OFFICE O/P EST MOD 30 MIN: CPT | Performed by: NURSE PRACTITIONER

## 2020-04-16 RX ORDER — CARVEDILOL 12.5 MG/1
TABLET ORAL
Qty: 60 TABLET | Refills: 1 | Status: SHIPPED | OUTPATIENT
Start: 2020-04-16 | End: 2020-07-21 | Stop reason: SDUPTHER

## 2020-04-16 RX ORDER — FUROSEMIDE 20 MG/1
20 TABLET ORAL SEE ADMIN INSTRUCTIONS
Qty: 180 TABLET | Refills: 1
Start: 2020-04-16 | End: 2020-07-21 | Stop reason: SDUPTHER

## 2020-04-16 NOTE — LETTER
2020    TELEHEALTH EVALUATION -- Audio/Visual ([facility])    HPI:    Anderson Andre (:  1960) has requested an audio/video evaluation for the following concern(s): cardiomyopathy, medication titration for heart failure. Hx of recent STEMI;  Admitted 3/14/20-3/16/20 with STEMI. S/p DEONTE to LAD x2. Echocardiogram shows LVEF down to 35%. Since last visit, coreg was adjusted. Lasix was also increased to 40mg alternating with 20mg the other days. Reports his breathing is stable and his cough has improved with the higher dose of the lasix. He feels better when he is taking the 40mg of the lasix. Continues to walk at home on the flat about 1/2 mile twice a day, gets some shortness of breath but improves after slowing down, resting and taking 4 deep breaths. Then he is able to continue at a slower pace. No edema. HOme weights running 148-150lbs. He had labs completed this past Monday, results pending. Review of Systems    Prior to Visit Medications    Medication Sig Taking?  Authorizing Provider   furosemide (LASIX) 20 MG tablet Take 1 tablet by mouth See Admin Instructions 20mg daily and okay to take additional 20mg daily for weight gain 3lbs in a day or 5lbs in a week  MONA Bruce CNP   atorvastatin (LIPITOR) 80 MG tablet Take 1 tablet by mouth nightly  MONA Longo CNP   carvedilol (COREG) 6.25 MG tablet 1 tab in the am and 2 tabs in the PM starting 20  MONA Longo CNP   ticagrelor (BRILINTA) 90 MG TABS tablet Take 1 tablet by mouth 2 times daily  MONA Longo CNP   valsartan (DIOVAN) 80 MG tablet Take 1 tablet by mouth daily  MONA Bruce CNP   dapagliflozin (FARXIGA) 5 MG tablet Take 1 tablet by mouth every morning  MONA Bruce CNP   insulin lispro protamine & lispro (HUMALOG MIX) (75-25) 100 UNIT per ML SUSP injection vial Inject 80 Units into the skin 2 times daily (with meals)  Historical Provider, MD   insulin lispro (HUMALOG) 100 UNIT/ML injection vial Inject 15 Units into the skin 3 times daily (before meals)  Historical Provider, MD   metFORMIN, MOD, (GLUMETZA) 500 MG extended release tablet Take 500 mg by mouth 2 times daily (with meals)   Historical Provider, MD   Ascorbic Acid (VITAMIN C) 250 MG CHEW Take 250 mg by mouth daily  Historical Provider, MD   Glucosamine 500 MG CAPS Take 500 mg by mouth daily  Historical Provider, MD   fenofibrate micronized (LOFIBRA) 134 MG capsule Take 1 capsule by mouth every morning (before breakfast) for 360 days. Tony Fine MD   Insulin Pen Needle (B-D ULTRAFINE III SHORT PEN) 31G X 8 MM MISC 1 each 5 times daily. Ximena Irwin MD   omega-3 acid ethyl esters (LOVAZA) 1 G capsule Take 2 capsules by mouth 2 times daily. Ximena Irwin MD   [id number] (CEREFOLIN) 6-1-50-5 MG TABS Take 1 tablet by mouth 3 times daily (with meals). Patient not taking: Reported on 4/1/2020  Ximena Irwin MD   fluticasone [facility]) 50 MCG/ACT nasal spray 2 sprays by Nasal route daily as needed for Rhinitis or Allergies   Historical Provider, MD   aspirin 81 MG tablet Take 81 mg by mouth daily.     Historical Provider, MD       Allergies   Allergen Reactions    No Known Allergies    ,   Past Medical History:   Diagnosis Date    Allergic rhinitis     Arrhythmia     CKD (chronic kidney disease) stage 3, GFR 30-59 ml/min ([address] 2008    Coronary artery disease involving native coronary artery of native heart with angina pectoris ([address] 3/15/2020    Diverticulitis     Diverticulitis     Fatigue     GERD (gastroesophageal reflux disease)     Hyperlipidemia     Hypertension 2004    Microalbuminuria 8/2011    Osteoarthritis 2006    Plantar fasciitis 7/2010    Testosterone deficiency 8/2011    Type 2 diabetes, uncontrolled, with renal manifestation ([address] 2004    Vitamin D deficiency    ,   Past Surgical History: Pursuant to the emergency declaration under the [facility] authority and the [employer] Act, this Virtual Visit was conducted with patient's (and/or legal guardian's) consent, to reduce the patient's risk of exposure to COVID-19 and provide necessary medical care. The patient (and/or legal guardian) has also been advised to contact this office for worsening conditions or problems, and seek emergency medical treatment and/or call 911 if deemed necessary. Services were provided through a video synchronous discussion virtually to substitute for in-person clinic visit. Patient and provider were located at their individual homes. --MONA Austin CNP on 4/16/2020 at 10:03 AM    An electronic signature was used to authenticate this note. Left message for patient to call back. Patient informed and VU. [address] Cardiology - [address]  Phone: [phone]  Fax: [phone]    MONA Austin CNP        April 17, 2020     [facility]  No address on file    Patient: Susana Ascencio  MR Number: [id number]  YOB: 1960  Date of Visit: 4/16/2020    [facility]    Thank you for the request for consultation for Janise Harada. Below are the relevant portions of my assessment and plan of care. If you have questions, please do not hesitate to call me. I look forward to following Rodri Maciel along with you.     Sincerely,        MONA Austin CNP

## 2020-04-16 NOTE — LETTER
[address] Cardiology - [address] [place] [id number] [facility]  Phone: [phone]  Fax: [phone]    MONA Pickett CNP        April 16, 2020    [address] [place] [address] [place]      Dear Vianca Ayala:  As discussed during your telehealth visit today,     Continue the lasix 40mg alternating with 20mg the other days - I have reviewed your labs and the renal (kidney function is stable). Recommend we repeat the Labs in 1 week and will also check your liver function tests  Adjust the coreg to 12.5mg twice a day (okay to take 2 tabs twice a day of the 6.25mg tablets until you get the higher strength and then take 1 tab twice a day)  Continue valsartan 80mg daily   Discussed exercise and activity, continue to walk on the flat, no hill climbing, limit stairs  Continue daily weights  Limit pushing, pulling and lifting more than 35lbs constantly  Okay to return to work on 4/20/20. Recommend working from home if feasible in his line of work. Discussed precautions regarding COVID-19 and to wear a mask if he has to go into his place of employment for anything, along with hand hygiene and social distancing. Work forms filled out and to be faxed    If you have any questions or concerns, please don't hesitate to call.     Sincerely,        MONA Pickett CNP

## 2020-04-16 NOTE — PROGRESS NOTES
Historical Provider, MD   insulin lispro (HUMALOG) 100 UNIT/ML injection vial Inject 15 Units into the skin 3 times daily (before meals)  Historical Provider, MD   metFORMIN, MOD, (GLUMETZA) 500 MG extended release tablet Take 500 mg by mouth 2 times daily (with meals)   Historical Provider, MD   Ascorbic Acid (VITAMIN C) 250 MG CHEW Take 250 mg by mouth daily  Historical Provider, MD   Glucosamine 500 MG CAPS Take 500 mg by mouth daily  Historical Provider, MD   fenofibrate micronized (LOFIBRA) 134 MG capsule Take 1 capsule by mouth every morning (before breakfast) for 360 days. Brooke Coley MD   Insulin Pen Needle (B-D ULTRAFINE III SHORT PEN) 31G X 8 MM MISC 1 each 5 times daily. Fransisco Bansal MD   omega-3 acid ethyl esters (LOVAZA) 1 G capsule Take 2 capsules by mouth 2 times daily. Fransisco Bansal MD   L-Methylfolate-P11-H6-M9 (CEREFOLIN) 6-1-50-5 MG TABS Take 1 tablet by mouth 3 times daily (with meals). Patient not taking: Reported on 4/1/2020  Franissco Bansal MD   fluticasone Madhuri Moles) 50 MCG/ACT nasal spray 2 sprays by Nasal route daily as needed for Rhinitis or Allergies   Historical Provider, MD   aspirin 81 MG tablet Take 81 mg by mouth daily.     Historical Provider, MD       Allergies   Allergen Reactions    No Known Allergies    ,   Past Medical History:   Diagnosis Date    Allergic rhinitis     Arrhythmia     CKD (chronic kidney disease) stage 3, GFR 30-59 ml/min ([address] 2008    Coronary artery disease involving native coronary artery of native heart with angina pectoris ([address] 3/15/2020    Diverticulitis     Diverticulitis     Fatigue     GERD (gastroesophageal reflux disease)     Hyperlipidemia     Hypertension 2004    Microalbuminuria 8/2011    Osteoarthritis 2006    Plantar fasciitis 7/2010    Testosterone deficiency 8/2011    Type 2 diabetes, uncontrolled, with renal manifestation ([address] 2004    Vitamin D deficiency    ,   Past Surgical History:   Procedure diastolic dysfunction with normal filling pressure.   Mild thickening of leaflets of mitral valve.   No mitral stenosis.   Mild mitral regurgitation.   No intracardiac thrombus. Definity echo contrast was used. Pertinent Problems:  ~shortness of breath, orthopnea- improving   ~Ischemic cardiomyopathy  ~Acute systolic heart failure. Recent STEMI 3/2020 and drop in LVEF 35%. ~STEMI; s/p DEONTE x2 in LAD  ~HLD  ~HTN  ~CAD  ~DM  ~CKD, appears baseline creatinine 1.5      ASSESSMENT/PLAN:     Diagnosis Orders   1. Ischemic cardiomyopathy     2. Coronary artery disease involving native coronary artery of native heart without angina pectoris     3. CKD (chronic kidney disease) stage 3, GFR 30-59 ml/min ([facility])     4. Essential hypertension     5. Shortness of breath       Plan:  Continue the lasix 40mg alternating with 20mg the other days   Adjust the coreg to 12.5mg twice a day (okay to take 2 tabs twice a day of the 6.25mg tablets until you get the higher strength and then take 1 tab twice a day)  Continue valsartan 80mg daily   Discussed exercise and activity, continue to walk on the flat, no hill climbing, limit stairs  Continue daily weights  Limit pushing, pulling and lifting more than 35lbs constantly  Okay to return to work on 4/20/20. Recommend working from home if feasible in his line of work. Discussed precautions regarding COVID-19 and to wear a mask if he has to go into his place of employment for anything, along with hand hygiene and social distancing. Work forms filled out      Mirta Varela is a 61 y.o. male being evaluated by a Virtual Visit (video visit) encounter to address concerns as mentioned above. A caregiver was present when appropriate. Due to this being a TeleHealth encounter (During UDJ-91 public health emergency), evaluation of the following organ systems was limited: Vitals/Constitutional/EENT/Resp/CV/GI//MS/Neuro/Skin/Heme-Lymph-Imm.   Pursuant to the emergency declaration under the

## 2020-04-17 LAB
AVERAGE GLUCOSE: NORMAL
B-TYPE NATRIURETIC PEPTIDE: 89.6 PG/ML
BUN BLDV-MCNC: 36 MG/DL
CALCIUM SERPL-MCNC: 9.4 MG/DL
CHLORIDE BLD-SCNC: 100 MMOL/L
CO2: 22 MMOL/L
CREAT SERPL-MCNC: 1.67 MG/DL
GFR CALCULATED: 44
GLUCOSE BLD-MCNC: 154 MG/DL
HBA1C MFR BLD: 8.3 %
POTASSIUM SERPL-SCNC: 4.4 MMOL/L
SODIUM BLD-SCNC: 154 MMOL/L

## 2020-04-24 LAB
ALBUMIN SERPL-MCNC: 4.5 G/DL
ALP BLD-CCNC: 67 U/L
ALT SERPL-CCNC: 26 U/L
ANION GAP SERPL CALCULATED.3IONS-SCNC: NORMAL MMOL/L
AST SERPL-CCNC: 21 U/L
B-TYPE NATRIURETIC PEPTIDE: 73.8 PG/ML
BILIRUB SERPL-MCNC: 0.4 MG/DL (ref 0.1–1.4)
BUN BLDV-MCNC: 32 MG/DL
CALCIUM SERPL-MCNC: 9.4 MG/DL
CHLORIDE BLD-SCNC: 103 MMOL/L
CO2: 21 MMOL/L
CREAT SERPL-MCNC: 1.55 MG/DL
GFR CALCULATED: 48
GLUCOSE BLD-MCNC: 145 MG/DL
POTASSIUM SERPL-SCNC: 4.4 MMOL/L
SODIUM BLD-SCNC: 143 MMOL/L
TOTAL PROTEIN: 7.3

## 2020-04-27 ENCOUNTER — TELEPHONE (OUTPATIENT)
Dept: CARDIOLOGY CLINIC | Age: 60
End: 2020-04-27

## 2020-06-01 ENCOUNTER — PATIENT MESSAGE (OUTPATIENT)
Dept: CARDIOLOGY CLINIC | Age: 60
End: 2020-06-01

## 2020-06-01 RX ORDER — VALSARTAN 80 MG/1
80 TABLET ORAL DAILY
Qty: 90 TABLET | Refills: 2 | Status: SHIPPED | OUTPATIENT
Start: 2020-06-01 | End: 2020-07-21 | Stop reason: SDUPTHER

## 2020-06-01 NOTE — TELEPHONE ENCOUNTER
From: Berhane Urena  To: Fransisco Meade APRN - CNP  Sent: 6/1/2020 3:38 PM EDT  Subject: Prescription Question    [facility] said they are not allowed to fill my Valsartan prescription and we have to have a prescription for Valsartan sent to [facility]. They wanted you to call [phone]. Please reply & advise. Thanks & have a great day!   Jose Daniel Loja

## 2020-07-21 RX ORDER — ATORVASTATIN CALCIUM 80 MG/1
80 TABLET, FILM COATED ORAL NIGHTLY
Qty: 90 TABLET | Refills: 3 | Status: SHIPPED | OUTPATIENT
Start: 2020-07-21 | End: 2020-07-21 | Stop reason: SDUPTHER

## 2020-07-21 RX ORDER — VALSARTAN 80 MG/1
80 TABLET ORAL DAILY
Qty: 90 TABLET | Refills: 2 | Status: SHIPPED | OUTPATIENT
Start: 2020-07-21 | End: 2020-07-21 | Stop reason: SDUPTHER

## 2020-07-21 RX ORDER — FUROSEMIDE 20 MG/1
20 TABLET ORAL SEE ADMIN INSTRUCTIONS
Qty: 180 TABLET | Refills: 1 | Status: SHIPPED | OUTPATIENT
Start: 2020-07-21 | End: 2020-07-21 | Stop reason: SDUPTHER

## 2020-07-21 RX ORDER — CARVEDILOL 12.5 MG/1
TABLET ORAL
Qty: 60 TABLET | Refills: 1 | Status: SHIPPED | OUTPATIENT
Start: 2020-07-21 | End: 2020-07-21 | Stop reason: SDUPTHER

## 2020-11-19 RX ORDER — CARVEDILOL 12.5 MG/1
TABLET ORAL
Qty: 120 TABLET | Refills: 5 | Status: SHIPPED | OUTPATIENT
Start: 2020-11-19 | End: 2021-02-16 | Stop reason: SDUPTHER

## 2021-01-26 ENCOUNTER — HOSPITAL ENCOUNTER (OUTPATIENT)
Dept: ULTRASOUND IMAGING | Age: 61
Discharge: HOME | End: 2021-01-26
Payer: COMMERCIAL

## 2021-01-26 DIAGNOSIS — R22.42 ANKLE MASS, LEFT: ICD-10-CM

## 2021-01-26 DIAGNOSIS — M25.572 LEFT ANKLE PAIN, UNSPECIFIED CHRONICITY: ICD-10-CM

## 2021-01-26 PROCEDURE — 76999 ECHO EXAMINATION PROCEDURE: CPT

## 2021-02-16 RX ORDER — VALSARTAN 80 MG/1
80 TABLET ORAL DAILY
Qty: 90 TABLET | Refills: 1 | Status: SHIPPED | OUTPATIENT
Start: 2021-02-16 | End: 2021-02-25 | Stop reason: ALTCHOICE

## 2021-02-16 RX ORDER — CARVEDILOL 12.5 MG/1
TABLET ORAL
Qty: 120 TABLET | Refills: 1 | Status: SHIPPED | OUTPATIENT
Start: 2021-02-16 | End: 2021-04-08 | Stop reason: SDUPTHER

## 2021-02-16 NOTE — TELEPHONE ENCOUNTER
Medication Refill    Medication needing refilled:  valsartan (DIOVAN) 80 MG tablet      carvedilol (COREG) 12.5 MG tablet [[id number]]        Dosage of the medication:    How are you taking this medication (QD, BID, TID, QID, PRN):    30 or 90 day supply called in:  30  When will you run out of your medication:  10 days  Which Pharmacy are we sending the medication to?:     [address] [place], [place] [address] - F [phone]   [address] [facility]   Phone:  [phone]  Fax:  [phone]

## 2021-02-25 ENCOUNTER — OFFICE VISIT (OUTPATIENT)
Dept: CARDIOLOGY CLINIC | Age: 61
End: 2021-02-25
Payer: COMMERCIAL

## 2021-02-25 VITALS
HEART RATE: 95 BPM | HEIGHT: 69 IN | OXYGEN SATURATION: 96 % | SYSTOLIC BLOOD PRESSURE: 112 MMHG | DIASTOLIC BLOOD PRESSURE: 70 MMHG | WEIGHT: 260 LBS | BODY MASS INDEX: 38.51 KG/M2

## 2021-02-25 DIAGNOSIS — I25.118 CORONARY ARTERY DISEASE OF NATIVE ARTERY OF NATIVE HEART WITH STABLE ANGINA PECTORIS (HCC): ICD-10-CM

## 2021-02-25 DIAGNOSIS — I25.5 ISCHEMIC CARDIOMYOPATHY: ICD-10-CM

## 2021-02-25 DIAGNOSIS — I50.22 CHRONIC SYSTOLIC HEART FAILURE (HCC): Primary | ICD-10-CM

## 2021-02-25 DIAGNOSIS — E78.49 OTHER HYPERLIPIDEMIA: ICD-10-CM

## 2021-02-25 DIAGNOSIS — I10 ESSENTIAL HYPERTENSION: ICD-10-CM

## 2021-02-25 PROCEDURE — 99214 OFFICE O/P EST MOD 30 MIN: CPT | Performed by: NURSE PRACTITIONER

## 2021-02-25 RX ORDER — M-VIT,TX,IRON,MINS/CALC/FOLIC 27MG-0.4MG
1 TABLET ORAL DAILY
COMMUNITY

## 2021-02-25 RX ORDER — EMPAGLIFLOZIN 25 MG/1
25 TABLET, FILM COATED ORAL DAILY
COMMUNITY

## 2021-02-25 RX ORDER — SACUBITRIL AND VALSARTAN 49; 51 MG/1; MG/1
1 TABLET, FILM COATED ORAL 2 TIMES DAILY
Qty: 60 TABLET | Refills: 2 | Status: SHIPPED | OUTPATIENT
Start: 2021-02-25 | End: 2021-02-25 | Stop reason: SDUPTHER

## 2021-02-25 RX ORDER — SACUBITRIL AND VALSARTAN 49; 51 MG/1; MG/1
1 TABLET, FILM COATED ORAL 2 TIMES DAILY
Qty: 60 TABLET | Refills: 2 | Status: SHIPPED | OUTPATIENT
Start: 2021-02-25 | End: 2021-04-08 | Stop reason: SDUPTHER

## 2021-02-25 NOTE — LETTER
has a past surgical history that includes eye surgery; Appendectomy; and shoulder surgery (2006). Social History:   reports that he has quit smoking. He has never used smokeless tobacco. He reports that he does not drink alcohol or use drugs. Family History:   Family History   Problem Relation Age of Onset    Glaucoma Mother     Heart Disease Father     Heart Attack Father     Cancer Maternal Grandmother     Cancer Maternal Grandfather        Home Medications:  Prior to Admission medications    Medication Sig Start Date End Date Taking?  Authorizing Provider   empagliflozin (JARDIANCE) 25 MG tablet Take 25 mg by mouth daily   Yes Historical Provider, MD   Methylcellulose, Laxative, (CITRUCEL PO) Take by mouth   Yes Historical Provider, MD   Multiple Vitamins-Minerals (THERAPEUTIC MULTIVITAMIN-MINERALS) tablet Take 1 tablet by mouth daily   Yes Historical Provider, MD   TURMERIC CURCUMIN PO Take by mouth   Yes Historical Provider, MD   carvedilol (COREG) 12.5 MG tablet TAKE 1 TABLET BY MOUTH  TWICE DAILY 2/16/21  Yes MONA Preciado CNP   valsartan (DIOVAN) 80 MG tablet Take 1 tablet by mouth daily 2/16/21  Yes MONA Preciado CNP   furosemide (LASIX) 20 MG tablet TAKE 1 TABLET BY MOUTH  EVERY OTHER DAY ALTERNATING WITH 2 TABLETS EVERY OTHER  DAY  Patient taking differently: Take 20 mg by mouth daily  2/2/21  Yes MONA Preciado CNP   atorvastatin (LIPITOR) 80 MG tablet Take 1 tablet by mouth nightly 7/21/20  Yes MONA Preciado CNP   ticagrelor (BRILINTA) 90 MG TABS tablet Take 1 tablet by mouth 2 times daily 4/8/20  Yes MONA Jamison CNP   insulin lispro protamine & lispro (HUMALOG MIX) (75-25) 100 UNIT per ML SUSP injection vial Inject 80 Units into the skin 2 times daily (with meals)   Yes Historical Provider, MD · Skin is warm and dry  · Moves all extremities well  Neurological/Psychiatric:  · Alert and oriented in all spheres  · No abnormalities of mood, affect, memory, mentation, or behavior are noted    Lab Data:  CBC:   Lab Results   Component Value Date    WBC 10.5 03/16/2020    WBC 11.3 03/15/2020    WBC 10.6 03/14/2020    RBC 4.64 03/16/2020    RBC 4.97 03/15/2020    RBC 5.49 03/14/2020    HGB 13.7 03/16/2020    HGB 14.4 03/15/2020    HGB 16.2 03/14/2020    HCT 40.8 03/16/2020    HCT 43.5 03/15/2020    HCT 48.4 03/14/2020    MCV 87.9 03/16/2020    MCV 87.5 03/15/2020    MCV 88.2 03/14/2020    RDW 14.4 03/16/2020    RDW 14.8 03/15/2020    RDW 14.7 03/14/2020     03/16/2020     03/15/2020     03/14/2020     Iron: No results found for: IRON, TIBC, FERRITIN  BMP:   Lab Results   Component Value Date     04/23/2020     04/13/2020     03/30/2020    K 4.4 04/23/2020    K 4.4 04/13/2020    K 4.7 03/30/2020    K 4.5 03/14/2020     04/23/2020     04/13/2020     03/30/2020    CO2 21 04/23/2020    CO2 22 04/13/2020    CO2 20 03/30/2020    PHOS 3.3 08/11/2011    BUN 32 04/23/2020    BUN 36 04/13/2020    BUN 30 03/30/2020    CREATININE 1.55 04/23/2020    CREATININE 1.67 04/13/2020    CREATININE 1.62 03/30/2020     BNP:   Lab Results   Component Value Date    PROBNP 1,278 03/14/2020     Lipids:   Lab Results   Component Value Date    CHOL 142 03/16/2020        Lab Results   Component Value Date    TRIG 149 03/16/2020        Lab Results   Component Value Date    HDL 47 03/16/2020        Lab Results   Component Value Date    LDLCALC 65 03/16/2020        Lab Results   Component Value Date    LABVLDL 30 03/16/2020      No results found for: CHOLHDLRATIO    EF:   Lab Results   Component Value Date    LVEF 35 03/16/2020       Recent Testing:  Cardiac cath 3/15/20  Findings:               LM         Normal     LAD       Ostial 80%, mid 100%         CX         20% mid

## 2021-02-25 NOTE — LETTER
[address] Cardiology - [address] [place] [address] [facility]  Phone: [phone]  Fax: [phone]    MONA Alvarado CNP        February 25, 2021    [address] [place] [address] [place] [id number]      Dear Bernard Mariano:    ***    If you have any questions or concerns, please don't hesitate to call.     Sincerely,        MONA Alvarado CNP

## 2021-02-25 NOTE — PROGRESS NOTES
[address]   Cardiac Follow-up    Primary Care Doctor:  Krunal Mon MD    Chief Complaint   Patient presents with    Follow-up    Congestive Heart Failure        History of Present Illness:   I had the pleasure of seeing Stephanie Pineda in follow up for ischemic cardiomyopathy. Admitted 3/14/20-3/16/20 with STEMI. S/p DEONTE to LAD x2. Echocardiogram shows LVEF down to 35%. Since last visit, Gets chest pain that come and go quickly and then gone. No nitro. 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 He gets fatigue at work. Just short of breath being at work at times. But is able to walk/run up 14 steps at home without much issues of shortness of breath. + bendopnea  Occasional dizziness when changing positions at times. Sleeps on his side, able to sleep on his back, but he feels uncomfortable with his breathing. Takes lasix daily, occasionaly skips a dose. He rarely takes 2 tabs of the Lasix anymore. Has cut back on fluid intake overall when he feels like he is developing worsening edema. Not checking his weights like he used to. Walking 2-4 miles a day; more with the better weather. Mild hills with walking- does push until he gets short of breath and then he rests for 4 breaths. Stephanie Pineda describes symptoms including dyspnea, fatigue, edema but denies early saiety, syncope. Past Medical History:   has a past medical history of Allergic rhinitis, Arrhythmia, CKD (chronic kidney disease) stage 3, GFR 30-59 ml/min, Coronary artery disease involving native coronary artery of native heart with angina pectoris ([address] Diverticulitis, Diverticulitis, Fatigue, GERD (gastroesophageal reflux disease), Hyperlipidemia, Hypertension, Microalbuminuria, Osteoarthritis, Plantar fasciitis, Testosterone deficiency, Type 2 diabetes, uncontrolled, with renal manifestation ([address] and Vitamin D deficiency.   Surgical History:   has a past surgical history that includes eye surgery; Appendectomy; and shoulder surgery (2006). Social History:   reports that he has quit smoking. He has never used smokeless tobacco. He reports that he does not drink alcohol or use drugs. Family History:   Family History   Problem Relation Age of Onset    Glaucoma Mother     Heart Disease Father     Heart Attack Father     Cancer Maternal Grandmother     Cancer Maternal Grandfather        Home Medications:  Prior to Admission medications    Medication Sig Start Date End Date Taking?  Authorizing Provider   empagliflozin (JARDIANCE) 25 MG tablet Take 25 mg by mouth daily   Yes Historical Provider, MD   Methylcellulose, Laxative, (CITRUCEL PO) Take by mouth   Yes Historical Provider, MD   Multiple Vitamins-Minerals (THERAPEUTIC MULTIVITAMIN-MINERALS) tablet Take 1 tablet by mouth daily   Yes Historical Provider, MD   TURMERIC CURCUMIN PO Take by mouth   Yes Historical Provider, MD   carvedilol (COREG) 12.5 MG tablet TAKE 1 TABLET BY MOUTH  TWICE DAILY 2/16/21  Yes MONA Singleton CNP   valsartan (DIOVAN) 80 MG tablet Take 1 tablet by mouth daily 2/16/21  Yes MONA Singleton CNP   furosemide (LASIX) 20 MG tablet TAKE 1 TABLET BY MOUTH  EVERY OTHER DAY ALTERNATING WITH 2 TABLETS EVERY OTHER  DAY  Patient taking differently: Take 20 mg by mouth daily  2/2/21  Yes MONA Singleton CNP   atorvastatin (LIPITOR) 80 MG tablet Take 1 tablet by mouth nightly 7/21/20  Yes MONA Singleton CNP   ticagrelor (BRILINTA) 90 MG TABS tablet Take 1 tablet by mouth 2 times daily 4/8/20  Yes MONA Luna CNP   insulin lispro protamine & lispro (HUMALOG MIX) (75-25) 100 UNIT per ML SUSP injection vial Inject 80 Units into the skin 2 times daily (with meals)   Yes Historical Provider, MD   insulin lispro (HUMALOG) 100 UNIT/ML injection vial Inject 15 Units into the skin 3 times daily (before meals)   Yes Historical Provider, MD   metFORMIN, MOD, (GLUMETZA) 500 MG extended release tablet Take 500 mg by mouth 2 times daily (with meals)  10/3/16  Yes Historical Provider, MD   Ascorbic Acid (VITAMIN C) 250 MG CHEW Take 250 mg by mouth daily   Yes Historical Provider, MD   Glucosamine 500 MG CAPS Take 500 mg by mouth daily   Yes Historical Provider, MD   omega-3 acid ethyl esters (LOVAZA) 1 G capsule Take 2 capsules by mouth 2 times daily. 3/16/12  Yes Waylon Barker MD   fluticasone [facility]) 50 MCG/ACT nasal spray 2 sprays by Nasal route daily as needed for Rhinitis or Allergies  5/6/11  Yes Historical Provider, MD   aspirin 81 MG tablet Take 81 mg by mouth daily. Yes Historical Provider, MD   fenofibrate micronized (LOFIBRA) 134 MG capsule Take 1 capsule by mouth every morning (before breakfast) for 360 days. 6/13/12 4/16/20  Jenna Jovel MD   Insulin Pen Needle (B-D ULTRAFINE III SHORT PEN) 31G X 8 MM MISC 1 each 5 times daily.  3/16/12   Waylon Barker MD        Allergies:  No known allergies     Physical Examination:    Vitals:    02/25/21 1501   BP: 112/70   Pulse: 95   SpO2: 96%   Weight: 260 lb (117.9 kg)   Height: 5' 9\" (1.753 m)        Constitutional and General Appearance: no apparent distress, obese  HEENT: non-icteric sclera, mask in place  Neck: JVP less than 8 cm H20  Respiratory:  · No use of accessory muscles  · No crackles in the bilateral bases  Cardiovascular:  · The apical impulses not displaced  · Heart tones are distant no murmur/rub/gallop  · Regular rate and rhythm, S1,S2 normal  · Radial pulses 2+ and equal bilaterally  · Trace BLE edema  · Pedal Pulses: 2+ and equal   Abdomen:  · No masses or tenderness  · Liver: No Abnormalities Noted  Musculoskeletal/Skin:  · Exhibits normal gait balance and coordination  · There is no clubbing, cyanosis of the extremities  · Skin is warm and dry  · Moves all extremities well  Neurological/Psychiatric:  · Alert and oriented in all spheres  · No abnormalities of mood, affect, memory, mentation, or behavior are noted    Lab Data:  CBC:   Lab Results   Component Value Date    WBC 10.5 03/16/2020    WBC 11.3 03/15/2020    WBC 10.6 03/14/2020    RBC 4.64 03/16/2020    RBC 4.97 03/15/2020    RBC 5.49 03/14/2020    HGB 13.7 03/16/2020    HGB 14.4 03/15/2020    HGB 16.2 03/14/2020    HCT 40.8 03/16/2020    HCT 43.5 03/15/2020    HCT 48.4 03/14/2020    MCV 87.9 03/16/2020    MCV 87.5 03/15/2020    MCV 88.2 03/14/2020    RDW 14.4 03/16/2020    RDW 14.8 03/15/2020    RDW 14.7 03/14/2020     03/16/2020     03/15/2020     03/14/2020     Iron: No results found for: IRON, TIBC, FERRITIN  BMP:   Lab Results   Component Value Date     04/23/2020     04/13/2020     03/30/2020    K 4.4 04/23/2020    K 4.4 04/13/2020    K 4.7 03/30/2020    K 4.5 03/14/2020     04/23/2020     04/13/2020     03/30/2020    CO2 21 04/23/2020    CO2 22 04/13/2020    CO2 20 03/30/2020    PHOS 3.3 08/11/2011    BUN 32 04/23/2020    BUN 36 04/13/2020    BUN 30 03/30/2020    CREATININE 1.55 04/23/2020    CREATININE 1.67 04/13/2020    CREATININE 1.62 03/30/2020     BNP:   Lab Results   Component Value Date    PROBNP 1,278 03/14/2020     Lipids:   Lab Results   Component Value Date    CHOL 142 03/16/2020        Lab Results   Component Value Date    TRIG 149 03/16/2020        Lab Results   Component Value Date    HDL 47 03/16/2020        Lab Results   Component Value Date    LDLCALC 65 03/16/2020        Lab Results   Component Value Date    LABVLDL 30 03/16/2020      No results found for: CHOLHDLRATIO    EF:   Lab Results   Component Value Date    LVEF 35 03/16/2020       Recent Testing:  Cardiac cath 3/15/20  Findings:               LM         Normal     LAD       Ostial 80%, mid 100%         CX         20% mid  RCA       30% mid                                               LVG       35%, anteroapical hypokinesis           LVEDP  15mmHg     Intervention:         PCI of LAD with 2.5X38 Xience overlapping with more proximal 3.0S24Qgiods  Distal PD with 2.75NC, Mid PD with 3.5 and prox PD with 4.0 (IVUS guided)      Echo 3/16/20   Summary   The left ventricular systolic function is moderately reduced with an   ejection fraction of 35 %. Hypokinesis of apical inferior,apical   septal,apical anterior and mid anteroseptal walls. Normal left ventricular   size with mild concentric left ventricular hypertrophy.   Grade I diastolic dysfunction with normal filling pressure.   Mild thickening of leaflets of mitral valve.   No mitral stenosis.   Mild mitral regurgitation.   No intracardiac thrombus. Definity echo contrast was used.     NYHA:   II-III  ACC/ AHA Stage:    C    Pertinent Problems:  ~Ischemic cardiomyopathy  systolic heart failure; heart failure with reduced ejection fraction  ~CAD; hx STEMI; s/p DEONTE x2 in LAD  ~HLD  ~HTN  ~DM  ~CKD, appears baseline creatinine 1.5     Visit Diagnosis:    1. Chronic systolic heart failure ([address]    2. Ischemic cardiomyopathy    3. Coronary artery disease of native artery of native heart with stable angina pectoris ([address]    4. Other hyperlipidemia    5. Essential hypertension        Plan:   1. Record your weights, if you gain 3lbs in a day or 5lbs in a week- call the office  2. Continue lasix to 20mg daily   3. Continue coreg 12.5mg twice a day   4. Obtain labs from PCP from last visit  5. Stop valsartan and start Entresto 49-51mg twice a day and check labs in 1 week   6. Continue brilinta, aspirin for now- let me know when you are due to refill the Brilinta  7. Continue walking daily   8. Follow up with Dr. Neelam Arteaga     I appreciate the opportunity for caring for this patient.      Patti Warner, CNP, 2/25/2021, 5:44 PM

## 2021-03-03 ENCOUNTER — HOSPITAL ENCOUNTER (OUTPATIENT)
Age: 61
Discharge: HOME | End: 2021-03-03
Payer: COMMERCIAL

## 2021-03-03 DIAGNOSIS — I25.5 ISCHEMIC CARDIOMYOPATHY: ICD-10-CM

## 2021-03-03 DIAGNOSIS — I50.22 CHRONIC SYSTOLIC HEART FAILURE (HCC): ICD-10-CM

## 2021-03-03 LAB
ALBUMIN SERPL-MCNC: 4.3 G/DL
ALP BLD-CCNC: 73 U/L
ALT SERPL-CCNC: 31 U/L
ANION GAP SERPL CALCULATED.3IONS-SCNC: 14 MMOL/L (ref 3–16)
ANION GAP SERPL CALCULATED.3IONS-SCNC: NORMAL MMOL/L
AST SERPL-CCNC: 25 U/L
AVERAGE GLUCOSE: 177
BASOPHILS ABSOLUTE: 0 /ΜL
BASOPHILS RELATIVE PERCENT: 0 %
BILIRUB SERPL-MCNC: 0.4 MG/DL (ref 0.1–1.4)
BUN BLDV-MCNC: 29 MG/DL (ref 7–20)
BUN BLDV-MCNC: 31 MG/DL
CALCIUM SERPL-MCNC: 9.3 MG/DL
CALCIUM SERPL-MCNC: 9.3 MG/DL (ref 8.3–10.6)
CHLORIDE BLD-SCNC: 103 MMOL/L
CHLORIDE BLD-SCNC: 99 MMOL/L (ref 99–110)
CHOLESTEROL, TOTAL: 154 MG/DL
CHOLESTEROL/HDL RATIO: NORMAL
CO2: 21 MMOL/L
CO2: 26 MMOL/L (ref 21–32)
CREAT SERPL-MCNC: 1.6 MG/DL (ref 0.8–1.3)
CREAT SERPL-MCNC: 1.63 MG/DL
EOSINOPHILS ABSOLUTE: 0.3 /ΜL
EOSINOPHILS RELATIVE PERCENT: 4 %
GFR AFRICAN AMERICAN: 54
GFR CALCULATED: 45
GFR NON-AFRICAN AMERICAN: 44
GLUCOSE BLD-MCNC: 164 MG/DL
GLUCOSE BLD-MCNC: 200 MG/DL (ref 70–99)
HBA1C MFR BLD: 7.8 %
HCT VFR BLD CALC: 47 % (ref 41–53)
HDLC SERPL-MCNC: 37 MG/DL (ref 35–70)
HEMOGLOBIN: 15.3 G/DL (ref 13.5–17.5)
LDL CHOLESTEROL CALCULATED: 75 MG/DL (ref 0–160)
LYMPHOCYTES ABSOLUTE: 1.5 /ΜL
LYMPHOCYTES RELATIVE PERCENT: 22 %
MCH RBC QN AUTO: 28.5 PG
MCHC RBC AUTO-ENTMCNC: 32.6 G/DL
MCV RBC AUTO: 88 FL
MONOCYTES ABSOLUTE: 0.6 /ΜL
MONOCYTES RELATIVE PERCENT: 9 %
NEUTROPHILS ABSOLUTE: 4.4 /ΜL
NEUTROPHILS RELATIVE PERCENT: 64 %
NONHDLC SERPL-MCNC: NORMAL MG/DL
PLATELET # BLD: 186 K/ΜL
PMV BLD AUTO: NORMAL FL
POTASSIUM SERPL-SCNC: 4.3 MMOL/L (ref 3.5–5.1)
POTASSIUM SERPL-SCNC: 4.8 MMOL/L
PRO-BNP: 44 PG/ML (ref 0–124)
RBC # BLD: 5.37 10^6/ΜL
SODIUM BLD-SCNC: 139 MMOL/L (ref 136–145)
SODIUM BLD-SCNC: 141 MMOL/L
TOTAL PROTEIN: 7.3
TRIGL SERPL-MCNC: 259 MG/DL
VLDLC SERPL CALC-MCNC: 42 MG/DL
WBC # BLD: 6.8 10^3/ML

## 2021-03-03 PROCEDURE — 80048 BASIC METABOLIC PNL TOTAL CA: CPT

## 2021-03-03 PROCEDURE — 36415 COLL VENOUS BLD VENIPUNCTURE: CPT

## 2021-03-03 PROCEDURE — 83880 ASSAY OF NATRIURETIC PEPTIDE: CPT

## 2021-03-04 ENCOUNTER — TELEPHONE (OUTPATIENT)
Dept: CARDIOLOGY CLINIC | Age: 61
End: 2021-03-04

## 2021-03-04 NOTE — TELEPHONE ENCOUNTER
----- Message from MONA Montanez CNP sent at 3/4/2021 11:42 AM EST -----  Labs are stable. Heart number is improved.  Continue current meds

## 2021-03-04 NOTE — TELEPHONE ENCOUNTER
Created telephone encounter. Per pt HIPAA form can not leave test results on machine. [facility] requesting pt to call the office for test results.

## 2021-03-15 RX ORDER — TICAGRELOR 90 MG/1
TABLET ORAL
Qty: 180 TABLET | Refills: 3 | Status: SHIPPED | OUTPATIENT
Start: 2021-03-15 | End: 2021-04-14

## 2021-04-09 ENCOUNTER — HOSPITAL ENCOUNTER (OUTPATIENT)
Dept: NON INVASIVE DIAGNOSTICS | Age: 61
Discharge: HOME | End: 2021-04-09
Payer: COMMERCIAL

## 2021-04-09 DIAGNOSIS — I50.22 CHRONIC SYSTOLIC HEART FAILURE (HCC): ICD-10-CM

## 2021-04-09 LAB
LV EF: 38 %
LVEF MODALITY: NORMAL

## 2021-04-09 PROCEDURE — 6360000004 HC RX CONTRAST MEDICATION: Performed by: NURSE PRACTITIONER

## 2021-04-09 PROCEDURE — C8929 TTE W OR WO FOL WCON,DOPPLER: HCPCS

## 2021-04-09 RX ADMIN — PERFLUTREN 2 MG: 6.52 INJECTION, SUSPENSION INTRAVENOUS at 09:21

## 2021-04-12 ENCOUNTER — TELEPHONE (OUTPATIENT)
Dept: CARDIOLOGY CLINIC | Age: 61
End: 2021-04-12

## 2021-04-12 NOTE — TELEPHONE ENCOUNTER
Home phone number had connection issues. Called mobile number, Spoke with Zulema Velasquez, relayed message per [facility] regarding echo results. Pt v/u and confirmed appt with SHELBY 4/14.

## 2021-04-12 NOTE — TELEPHONE ENCOUNTER
----- Message from MONA Cummings CNP sent at 4/9/2021  4:17 PM EDT -----  Please call patient. Heart function is 35-40% about the same as before.

## 2021-04-14 ENCOUNTER — OFFICE VISIT (OUTPATIENT)
Dept: CARDIOLOGY CLINIC | Age: 61
End: 2021-04-14
Payer: COMMERCIAL

## 2021-04-14 VITALS
SYSTOLIC BLOOD PRESSURE: 110 MMHG | HEART RATE: 97 BPM | WEIGHT: 259 LBS | DIASTOLIC BLOOD PRESSURE: 72 MMHG | HEIGHT: 69 IN | BODY MASS INDEX: 38.36 KG/M2 | OXYGEN SATURATION: 98 %

## 2021-04-14 DIAGNOSIS — I25.10 CORONARY ARTERY DISEASE INVOLVING NATIVE CORONARY ARTERY OF NATIVE HEART WITHOUT ANGINA PECTORIS: Primary | ICD-10-CM

## 2021-04-14 DIAGNOSIS — E78.49 OTHER HYPERLIPIDEMIA: ICD-10-CM

## 2021-04-14 DIAGNOSIS — I10 HYPERTENSION, UNSPECIFIED TYPE: ICD-10-CM

## 2021-04-14 DIAGNOSIS — I25.5 ISCHEMIC CARDIOMYOPATHY: ICD-10-CM

## 2021-04-14 DIAGNOSIS — I21.01 ST ELEVATION MYOCARDIAL INFARCTION INVOLVING LEFT MAIN CORONARY ARTERY (HCC): ICD-10-CM

## 2021-04-14 PROCEDURE — 99214 OFFICE O/P EST MOD 30 MIN: CPT | Performed by: INTERNAL MEDICINE

## 2021-04-14 RX ORDER — CLOPIDOGREL BISULFATE 75 MG/1
75 TABLET ORAL DAILY
Qty: 90 TABLET | Refills: 3 | Status: SHIPPED | OUTPATIENT
Start: 2021-04-14 | End: 2022-01-25 | Stop reason: SDUPTHER

## 2021-04-14 RX ORDER — VALSARTAN 80 MG/1
80 TABLET ORAL DAILY
COMMUNITY
End: 2021-04-14

## 2021-04-14 NOTE — PROGRESS NOTES
Vitamin D deficiency    Hyperlipidemia    Hypertension    CKD (chronic kidney disease) stage 3, GFR 30-59 ml/min    Testosterone deficiency    Fatigue    ST elevation myocardial infarction (STEMI) (HCC)    Coronary artery disease involving native coronary artery of native heart without angina pectoris    STEMI (ST elevation myocardial infarction) ([facility] [address]    Ischemic cardiomyopathy         Past Medical History:   has a past medical history of Allergic rhinitis, Arrhythmia, CKD (chronic kidney disease) stage 3, GFR 30-59 ml/min, Coronary artery disease involving native coronary artery of native heart with angina pectoris ([address] Diverticulitis, Diverticulitis, Fatigue, GERD (gastroesophageal reflux disease), Hyperlipidemia, Hypertension, Microalbuminuria, Osteoarthritis, Plantar fasciitis, Testosterone deficiency, Type 2 diabetes, uncontrolled, with renal manifestation ([facility] [address] and Vitamin D deficiency. Surgical History:   has a past surgical history that includes eye surgery; Appendectomy; and shoulder surgery (2006). Social History:   reports that he has quit smoking. He has never used smokeless tobacco. He reports that he does not drink alcohol or use drugs. Family History:  No evidence for sudden cardiac death or premature CAD    Home Medications:  Reviewed and are listed in nursing record.  and/or listed below  Current Outpatient Medications   Medication Sig Dispense Refill    carvedilol (COREG) 12.5 MG tablet TAKE 1 TABLET BY MOUTH  TWICE DAILY 180 tablet 3    sacubitril-valsartan (ENTRESTO) 49-51 MG per tablet Take 1 tablet by mouth 2 times daily 180 tablet 3    BRILINTA 90 MG TABS tablet TAKE 1 TABLET BY MOUTH  TWICE DAILY 180 tablet 3    empagliflozin (JARDIANCE) 25 MG tablet Take 25 mg by mouth daily      Methylcellulose, Laxative, (CITRUCEL PO) Take by mouth      Multiple Vitamins-Minerals (THERAPEUTIC MULTIVITAMIN-MINERALS) tablet Take 1 tablet by mouth daily      TURMERIC CURCUMIN PO Take by mouth      furosemide (LASIX) 20 MG tablet TAKE 1 TABLET BY MOUTH  EVERY OTHER DAY ALTERNATING WITH 2 TABLETS EVERY OTHER  DAY (Patient taking differently: Take 20 mg by mouth daily ) 135 tablet 3    atorvastatin (LIPITOR) 80 MG tablet Take 1 tablet by mouth nightly 90 tablet 3    insulin lispro protamine & lispro (HUMALOG MIX) (75-25) 100 UNIT per ML SUSP injection vial Inject 80 Units into the skin 2 times daily (with meals)      insulin lispro (HUMALOG) 100 UNIT/ML injection vial Inject 15 Units into the skin 3 times daily (before meals)      metFORMIN, MOD, (GLUMETZA) 500 MG extended release tablet Take 500 mg by mouth 2 times daily (with meals)       Ascorbic Acid (VITAMIN C) 250 MG CHEW Take 250 mg by mouth daily      Glucosamine 500 MG CAPS Take 500 mg by mouth daily      fenofibrate micronized (LOFIBRA) 134 MG capsule Take 1 capsule by mouth every morning (before breakfast) for 360 days. 90 capsule 3    Insulin Pen Needle (B-D ULTRAFINE III SHORT PEN) 31G X 8 MM MISC 1 each 5 times daily. 450 each 3    omega-3 acid ethyl esters (LOVAZA) 1 G capsule Take 2 capsules by mouth 2 times daily. 360 capsule 3    fluticasone (FLONASE) 50 MCG/ACT nasal spray 2 sprays by Nasal route daily as needed for Rhinitis or Allergies       aspirin 81 MG tablet Take 81 mg by mouth daily. No current facility-administered medications for this visit. Allergies:  No known allergies     Review of Systems:   A 14 point review of symptoms completed. Pertinent positives identified in the HPI, all other review of symptoms negative as below. Objective:   PHYSICAL EXAM:    There were no vitals filed for this visit.        Wt Readings from Last 3 Encounters:   02/25/21 260 lb (117.9 kg)   04/16/20 249 lb 6.4 oz (113.1 kg)   04/01/20 257 lb (116.6 kg)         General Appearance:  Alert, cooperative, no distress, appears stated age   Head:  Normocephalic, atraumatic   Eyes:  PERRL, conjunctiva/corneas clear   Nose: Nares normal, no drainage or sinus tenderness   Throat: Lips, mucosa, and tongue normal   Neck: Supple, symmetrical, trachea midline, NL thyroid no carotid bruit or JVD   Lungs:   CTAB, respirations unlabored   Chest Wall:  No tenderness or deformity   Heart:  Regular rhythm and normal rate; S1, S2 are normal;   no murmur noted; no rub or gallop   Abdomen:   Soft, non-tender, +BS x 4, no masses, no organomegaly   Extremities: Extremities normal, atraumatic, no cyanosis or edema   Pulses: 2+ and symmetric   Skin: Skin color, texture, turgor normal, no rashes or lesions   Pysch: Normal mood and affect   Neurologic: Normal gross motor and sensory exam.         LABS   CBC:      Lab Results   Component Value Date    WBC 6.8 01/19/2021    RBC 5.37 01/19/2021    HGB 15.3 01/19/2021    HCT 47.0 01/19/2021    MCV 88 01/19/2021    RDW 14.4 03/16/2020     01/19/2021     CMP:  Lab Results   Component Value Date     03/03/2021    K 4.3 03/03/2021    K 4.5 03/14/2020    CL 99 03/03/2021    CO2 26 03/03/2021    BUN 29 03/03/2021    CREATININE 1.6 03/03/2021    GFRAA 54 03/03/2021    GFRAA 63 03/16/2012    AGRATIO 1.1 03/14/2020    LABGLOM 44 03/03/2021    GLUCOSE 200 03/03/2021    GLUCOSE 191 03/16/2012    PROT 7.6 03/14/2020    PROT 7.0 06/13/2012    CALCIUM 9.3 03/03/2021    BILITOT 0.4 01/19/2021    ALKPHOS 73 01/19/2021    AST 25 01/19/2021    ALT 31 01/19/2021     PT/INR:   No results found for: PTINR  Liver:  No components found for: CHLPL  Lab Results   Component Value Date    ALT 31 01/19/2021    AST 25 01/19/2021    ALKPHOS 73 01/19/2021    BILITOT 0.4 01/19/2021     Lab Results   Component Value Date    LABA1C 7.8 01/19/2021     Lipids:         Lab Results   Component Value Date    TRIG 259 01/19/2021    TRIG 149 03/16/2020    TRIG 154 (H) 06/13/2012            Lab Results   Component Value Date    HDL 37 01/19/2021    HDL 47 03/16/2020    HDL 42 06/13/2012            Lab Results   Component Severe 1vd as above      VASCULAR/OTHER IMAGING:      Assessment and Plan   Kolby Lopez is a 61 y.o. male who presents today for the following problems:      1. CAD   -3/2020. PCI to LAD x2 drug-eluting stents for STEMI  2. Ischemic Cardiomyopathy: LVEF 35 to 40%. 3.  Retention: Controlled. 4.  Hyperlipidemia: Controlled    MD Plan:  1. He is doing overall well with no cardiac signs or symptoms. 2.  His DAPT score is 3, suggest continuing dual antiplatelet therapy but I will change him to Plavix for dyspnea just to rule out that this is not caused by Brilinta  3. Patient is doing well on Entresto spoke to him at length about monitoring fluid intake, weight and CHF status  4. He did ask and I did tell him that I do not think that skydiving is in the best interest of his cardiovascular health with his car myopathy  5. Continue aspirin, Coreg, Lipitor, Entresto. Patient Active Problem List   Diagnosis    Type 2 diabetes, uncontrolled, with renal manifestation (HCC)    Vitamin D deficiency    Hyperlipidemia    Hypertension    CKD (chronic kidney disease) stage 3, GFR 30-59 ml/min    Testosterone deficiency    Fatigue    ST elevation myocardial infarction (STEMI) ([address]    Coronary artery disease involving native coronary artery of native heart without angina pectoris    STEMI (ST elevation myocardial infarction) ([address]    Ischemic cardiomyopathy       Patient Plan:  1. Check your medications at home to make sure you are not taking Valsartan. This was stopped when the [facility] was started. 2. Stop Brilinta. 3. Start Plavix 75 mg once daily. Start this once you run out of your Brilinta. Do NOT take both of these together. 4. Continue to check your weight and monitor your fluid intake. 5. Follow up with Camila in 6 months.        This note was scribed in the presence of Dr. Lavona Severance MD by Cher Najera, ANGEL.              Erma Fernandez MD, personally performed the services described in this

## 2021-04-14 NOTE — PATIENT INSTRUCTIONS
Patient Plan:  1. Check your medications at home to make sure you are not taking Valsartan. This was stopped when the [facility] was started. 2. Stop Brilinta. 3. Start Plavix 75 mg once daily. Start this once you run out of your Brilinta. Do NOT take both of these together. 4. Continue to check your weight and monitor your fluid intake. 5. Follow up with Camila in 6 months.

## 2021-05-12 LAB — SARS-COV-2: DETECTED

## 2021-05-16 ENCOUNTER — APPOINTMENT (OUTPATIENT)
Dept: GENERAL RADIOLOGY | Age: 61
DRG: 871 | End: 2021-05-16
Payer: COMMERCIAL

## 2021-05-16 ENCOUNTER — HOSPITAL ENCOUNTER (INPATIENT)
Age: 61
LOS: 4 days | Discharge: HOME | DRG: 871 | End: 2021-05-20
Attending: EMERGENCY MEDICINE | Admitting: INTERNAL MEDICINE
Payer: COMMERCIAL

## 2021-05-16 DIAGNOSIS — R73.9 HYPERGLYCEMIA: ICD-10-CM

## 2021-05-16 DIAGNOSIS — J96.01 ACUTE HYPOXEMIC RESPIRATORY FAILURE DUE TO COVID-19 (HCC): Primary | ICD-10-CM

## 2021-05-16 DIAGNOSIS — U07.1 ACUTE HYPOXEMIC RESPIRATORY FAILURE DUE TO COVID-19 (HCC): Primary | ICD-10-CM

## 2021-05-16 LAB
A/G RATIO: 1 (ref 1.1–2.2)
ALBUMIN SERPL-MCNC: 3.7 G/DL (ref 3.4–5)
ALP BLD-CCNC: 65 U/L (ref 40–129)
ALT SERPL-CCNC: 38 U/L (ref 10–40)
ANION GAP SERPL CALCULATED.3IONS-SCNC: 14 MMOL/L (ref 3–16)
ANISOCYTOSIS: ABNORMAL
AST SERPL-CCNC: 31 U/L (ref 15–37)
BASE EXCESS VENOUS: 0 MMOL/L (ref -3–3)
BASOPHILS ABSOLUTE: 0 K/UL (ref 0–0.2)
BASOPHILS RELATIVE PERCENT: 0 %
BILIRUB SERPL-MCNC: 0.4 MG/DL (ref 0–1)
BILIRUBIN URINE: NEGATIVE
BLOOD, URINE: NEGATIVE
BUN BLDV-MCNC: 36 MG/DL (ref 7–20)
CALCIUM SERPL-MCNC: 8.7 MG/DL (ref 8.3–10.6)
CARBOXYHEMOGLOBIN: 1.3 % (ref 0–1.5)
CHLORIDE BLD-SCNC: 99 MMOL/L (ref 99–110)
CLARITY: CLEAR
CO2: 24 MMOL/L (ref 21–32)
COLOR: YELLOW
CREAT SERPL-MCNC: 1.2 MG/DL (ref 0.8–1.3)
EKG ATRIAL RATE: 96 BPM
EKG DIAGNOSIS: NORMAL
EKG P AXIS: 57 DEGREES
EKG P-R INTERVAL: 170 MS
EKG Q-T INTERVAL: 366 MS
EKG QRS DURATION: 84 MS
EKG QTC CALCULATION (BAZETT): 462 MS
EKG R AXIS: -25 DEGREES
EKG T AXIS: 62 DEGREES
EKG VENTRICULAR RATE: 96 BPM
EOSINOPHILS ABSOLUTE: 0 K/UL (ref 0–0.6)
EOSINOPHILS RELATIVE PERCENT: 0 %
GFR AFRICAN AMERICAN: >60
GFR NON-AFRICAN AMERICAN: >60
GLOBULIN: 3.8 G/DL
GLUCOSE BLD-MCNC: 345 MG/DL (ref 70–99)
GLUCOSE BLD-MCNC: 399 MG/DL (ref 70–99)
GLUCOSE URINE: >=1000 MG/DL
HCO3 VENOUS: 25.3 MMOL/L (ref 23–29)
HCT VFR BLD CALC: 42.2 % (ref 40.5–52.5)
HEMOGLOBIN: 14.3 G/DL (ref 13.5–17.5)
KETONES, URINE: NEGATIVE MG/DL
LACTIC ACID, SEPSIS: 2.1 MMOL/L (ref 0.4–1.9)
LACTIC ACID, SEPSIS: 2.3 MMOL/L (ref 0.4–1.9)
LACTIC ACID: 3.1 MMOL/L (ref 0.4–2)
LEUKOCYTE ESTERASE, URINE: NEGATIVE
LYMPHOCYTES ABSOLUTE: 0 K/UL (ref 1–5.1)
LYMPHOCYTES RELATIVE PERCENT: 0 %
MCH RBC QN AUTO: 29.9 PG (ref 26–34)
MCHC RBC AUTO-ENTMCNC: 33.9 G/DL (ref 31–36)
MCV RBC AUTO: 88 FL (ref 80–100)
METAMYELOCYTES RELATIVE PERCENT: 1 %
METHEMOGLOBIN VENOUS: 0.3 %
MICROSCOPIC EXAMINATION: ABNORMAL
MONOCYTES ABSOLUTE: 0.2 K/UL (ref 0–1.3)
MONOCYTES RELATIVE PERCENT: 2 %
NEUTROPHILS ABSOLUTE: 8.1 K/UL (ref 1.7–7.7)
NEUTROPHILS RELATIVE PERCENT: 97 %
NITRITE, URINE: NEGATIVE
O2 CONTENT, VEN: 16 VOL %
O2 SAT, VEN: 75 %
O2 THERAPY: ABNORMAL
PCO2, VEN: 43.4 MMHG (ref 40–50)
PDW BLD-RTO: 15.6 % (ref 12.4–15.4)
PERFORMED ON: ABNORMAL
PH UA: 5.5 (ref 5–8)
PH VENOUS: 7.38 (ref 7.35–7.45)
PLATELET # BLD: 251 K/UL (ref 135–450)
PMV BLD AUTO: 7 FL (ref 5–10.5)
PO2, VEN: 40.7 MMHG (ref 25–40)
POTASSIUM REFLEX MAGNESIUM: 5 MMOL/L (ref 3.5–5.1)
PRO-BNP: 178 PG/ML (ref 0–124)
PROCALCITONIN: 0.16 NG/ML (ref 0–0.15)
PROTEIN UA: NEGATIVE MG/DL
RBC # BLD: 4.8 M/UL (ref 4.2–5.9)
SLIDE REVIEW: ABNORMAL
SODIUM BLD-SCNC: 137 MMOL/L (ref 136–145)
SPECIFIC GRAVITY UA: <=1.005 (ref 1–1.03)
TCO2 CALC VENOUS: 27 MMOL/L
TOTAL PROTEIN: 7.5 G/DL (ref 6.4–8.2)
TROPONIN: <0.01 NG/ML
URINE REFLEX TO CULTURE: ABNORMAL
URINE TYPE: ABNORMAL
UROBILINOGEN, URINE: 0.2 E.U./DL
WBC # BLD: 8.3 K/UL (ref 4–11)

## 2021-05-16 PROCEDURE — 85025 COMPLETE CBC W/AUTO DIFF WBC: CPT

## 2021-05-16 PROCEDURE — 82803 BLOOD GASES ANY COMBINATION: CPT

## 2021-05-16 PROCEDURE — 93005 ELECTROCARDIOGRAM TRACING: CPT | Performed by: PHYSICIAN ASSISTANT

## 2021-05-16 PROCEDURE — 2700000000 HC OXYGEN THERAPY PER DAY

## 2021-05-16 PROCEDURE — 93010 ELECTROCARDIOGRAM REPORT: CPT | Performed by: INTERNAL MEDICINE

## 2021-05-16 PROCEDURE — 94761 N-INVAS EAR/PLS OXIMETRY MLT: CPT

## 2021-05-16 PROCEDURE — 6360000002 HC RX W HCPCS: Performed by: PHYSICIAN ASSISTANT

## 2021-05-16 PROCEDURE — 83605 ASSAY OF LACTIC ACID: CPT

## 2021-05-16 PROCEDURE — 83036 HEMOGLOBIN GLYCOSYLATED A1C: CPT

## 2021-05-16 PROCEDURE — 84145 PROCALCITONIN (PCT): CPT

## 2021-05-16 PROCEDURE — 87040 BLOOD CULTURE FOR BACTERIA: CPT

## 2021-05-16 PROCEDURE — 36415 COLL VENOUS BLD VENIPUNCTURE: CPT

## 2021-05-16 PROCEDURE — 83880 ASSAY OF NATRIURETIC PEPTIDE: CPT

## 2021-05-16 PROCEDURE — 99285 EMERGENCY DEPT VISIT HI MDM: CPT

## 2021-05-16 PROCEDURE — 6370000000 HC RX 637 (ALT 250 FOR IP): Performed by: EMERGENCY MEDICINE

## 2021-05-16 PROCEDURE — 96374 THER/PROPH/DIAG INJ IV PUSH: CPT

## 2021-05-16 PROCEDURE — 84484 ASSAY OF TROPONIN QUANT: CPT

## 2021-05-16 PROCEDURE — 1200000000 HC SEMI PRIVATE

## 2021-05-16 PROCEDURE — 96375 TX/PRO/DX INJ NEW DRUG ADDON: CPT

## 2021-05-16 PROCEDURE — 81003 URINALYSIS AUTO W/O SCOPE: CPT

## 2021-05-16 PROCEDURE — 80053 COMPREHEN METABOLIC PANEL: CPT

## 2021-05-16 PROCEDURE — 71045 X-RAY EXAM CHEST 1 VIEW: CPT

## 2021-05-16 PROCEDURE — 6370000000 HC RX 637 (ALT 250 FOR IP): Performed by: INTERNAL MEDICINE

## 2021-05-16 PROCEDURE — 6370000000 HC RX 637 (ALT 250 FOR IP): Performed by: PHYSICIAN ASSISTANT

## 2021-05-16 RX ORDER — SODIUM CHLORIDE 9 MG/ML
INJECTION, SOLUTION INTRAVENOUS CONTINUOUS
Status: DISCONTINUED | OUTPATIENT
Start: 2021-05-16 | End: 2021-05-17

## 2021-05-16 RX ORDER — POLYETHYLENE GLYCOL 3350 17 G/17G
17 POWDER, FOR SOLUTION ORAL DAILY PRN
Status: DISCONTINUED | OUTPATIENT
Start: 2021-05-16 | End: 2021-05-20 | Stop reason: HOSPADM

## 2021-05-16 RX ORDER — IPRATROPIUM BROMIDE AND ALBUTEROL SULFATE 2.5; .5 MG/3ML; MG/3ML
1 SOLUTION RESPIRATORY (INHALATION) ONCE
Status: COMPLETED | OUTPATIENT
Start: 2021-05-16 | End: 2021-05-16

## 2021-05-16 RX ORDER — ONDANSETRON 2 MG/ML
4 INJECTION INTRAMUSCULAR; INTRAVENOUS EVERY 6 HOURS PRN
Status: DISCONTINUED | OUTPATIENT
Start: 2021-05-16 | End: 2021-05-20 | Stop reason: HOSPADM

## 2021-05-16 RX ORDER — ALBUTEROL SULFATE 90 UG/1
2 AEROSOL, METERED RESPIRATORY (INHALATION) EVERY 6 HOURS PRN
Status: DISCONTINUED | OUTPATIENT
Start: 2021-05-16 | End: 2021-05-20 | Stop reason: HOSPADM

## 2021-05-16 RX ORDER — ACETAMINOPHEN 325 MG/1
650 TABLET ORAL EVERY 6 HOURS PRN
Status: DISCONTINUED | OUTPATIENT
Start: 2021-05-16 | End: 2021-05-20 | Stop reason: HOSPADM

## 2021-05-16 RX ORDER — SODIUM CHLORIDE 9 MG/ML
25 INJECTION, SOLUTION INTRAVENOUS PRN
Status: DISCONTINUED | OUTPATIENT
Start: 2021-05-16 | End: 2021-05-20 | Stop reason: HOSPADM

## 2021-05-16 RX ORDER — DEXTROSE MONOHYDRATE 50 MG/ML
100 INJECTION, SOLUTION INTRAVENOUS PRN
Status: DISCONTINUED | OUTPATIENT
Start: 2021-05-16 | End: 2021-05-20 | Stop reason: HOSPADM

## 2021-05-16 RX ORDER — CARVEDILOL 6.25 MG/1
12.5 TABLET ORAL 2 TIMES DAILY WITH MEALS
Status: DISCONTINUED | OUTPATIENT
Start: 2021-05-17 | End: 2021-05-20 | Stop reason: HOSPADM

## 2021-05-16 RX ORDER — M-VIT,TX,IRON,MINS/CALC/FOLIC 27MG-0.4MG
1 TABLET ORAL DAILY
Status: DISCONTINUED | OUTPATIENT
Start: 2021-05-17 | End: 2021-05-20 | Stop reason: HOSPADM

## 2021-05-16 RX ORDER — ALBUTEROL SULFATE 90 UG/1
2 AEROSOL, METERED RESPIRATORY (INHALATION)
Status: DISCONTINUED | OUTPATIENT
Start: 2021-05-16 | End: 2021-05-17

## 2021-05-16 RX ORDER — DEXAMETHASONE SODIUM PHOSPHATE 10 MG/ML
10 INJECTION, SOLUTION INTRAMUSCULAR; INTRAVENOUS ONCE
Status: COMPLETED | OUTPATIENT
Start: 2021-05-16 | End: 2021-05-16

## 2021-05-16 RX ORDER — ACETAMINOPHEN 650 MG/1
650 SUPPOSITORY RECTAL EVERY 6 HOURS PRN
Status: DISCONTINUED | OUTPATIENT
Start: 2021-05-16 | End: 2021-05-20 | Stop reason: HOSPADM

## 2021-05-16 RX ORDER — SODIUM CHLORIDE 0.9 % (FLUSH) 0.9 %
5-40 SYRINGE (ML) INJECTION EVERY 12 HOURS SCHEDULED
Status: DISCONTINUED | OUTPATIENT
Start: 2021-05-16 | End: 2021-05-20 | Stop reason: HOSPADM

## 2021-05-16 RX ORDER — PROMETHAZINE HYDROCHLORIDE 25 MG/1
12.5 TABLET ORAL EVERY 6 HOURS PRN
Status: DISCONTINUED | OUTPATIENT
Start: 2021-05-16 | End: 2021-05-20 | Stop reason: HOSPADM

## 2021-05-16 RX ORDER — SODIUM CHLORIDE 0.9 % (FLUSH) 0.9 %
5-40 SYRINGE (ML) INJECTION PRN
Status: DISCONTINUED | OUTPATIENT
Start: 2021-05-16 | End: 2021-05-20 | Stop reason: HOSPADM

## 2021-05-16 RX ORDER — FENOFIBRATE 160 MG/1
160 TABLET ORAL DAILY
Status: DISCONTINUED | OUTPATIENT
Start: 2021-05-17 | End: 2021-05-20 | Stop reason: HOSPADM

## 2021-05-16 RX ORDER — INSULIN GLARGINE 100 [IU]/ML
40 INJECTION, SOLUTION SUBCUTANEOUS NIGHTLY
Status: DISCONTINUED | OUTPATIENT
Start: 2021-05-16 | End: 2021-05-18

## 2021-05-16 RX ORDER — NICOTINE POLACRILEX 4 MG
15 LOZENGE BUCCAL PRN
Status: DISCONTINUED | OUTPATIENT
Start: 2021-05-16 | End: 2021-05-20 | Stop reason: HOSPADM

## 2021-05-16 RX ORDER — DEXTROSE MONOHYDRATE 25 G/50ML
12.5 INJECTION, SOLUTION INTRAVENOUS PRN
Status: DISCONTINUED | OUTPATIENT
Start: 2021-05-16 | End: 2021-05-20 | Stop reason: HOSPADM

## 2021-05-16 RX ORDER — ATORVASTATIN CALCIUM 40 MG/1
80 TABLET, FILM COATED ORAL NIGHTLY
Status: DISCONTINUED | OUTPATIENT
Start: 2021-05-16 | End: 2021-05-20 | Stop reason: HOSPADM

## 2021-05-16 RX ORDER — CLOPIDOGREL BISULFATE 75 MG/1
75 TABLET ORAL DAILY
Status: DISCONTINUED | OUTPATIENT
Start: 2021-05-17 | End: 2021-05-20 | Stop reason: HOSPADM

## 2021-05-16 RX ORDER — ASPIRIN 81 MG/1
81 TABLET, CHEWABLE ORAL DAILY
Status: DISCONTINUED | OUTPATIENT
Start: 2021-05-17 | End: 2021-05-20 | Stop reason: HOSPADM

## 2021-05-16 RX ORDER — GUAIFENESIN/DEXTROMETHORPHAN 100-10MG/5
5 SYRUP ORAL EVERY 4 HOURS PRN
Status: DISCONTINUED | OUTPATIENT
Start: 2021-05-16 | End: 2021-05-20 | Stop reason: HOSPADM

## 2021-05-16 RX ORDER — VITAMIN B COMPLEX
2000 TABLET ORAL DAILY
Status: DISCONTINUED | OUTPATIENT
Start: 2021-05-17 | End: 2021-05-20 | Stop reason: HOSPADM

## 2021-05-16 RX ADMIN — IPRATROPIUM BROMIDE AND ALBUTEROL SULFATE 1 AMPULE: 2.5; .5 SOLUTION RESPIRATORY (INHALATION) at 18:33

## 2021-05-16 RX ADMIN — INSULIN LISPRO 6 UNITS: 100 INJECTION, SOLUTION INTRAVENOUS; SUBCUTANEOUS at 19:49

## 2021-05-16 RX ADMIN — DEXAMETHASONE SODIUM PHOSPHATE 10 MG: 10 INJECTION, SOLUTION INTRAMUSCULAR; INTRAVENOUS at 18:28

## 2021-05-16 RX ADMIN — IPRATROPIUM BROMIDE AND ALBUTEROL SULFATE 1 AMPULE: 2.5; .5 SOLUTION RESPIRATORY (INHALATION) at 18:29

## 2021-05-16 ASSESSMENT — ENCOUNTER SYMPTOMS
COUGH: 1
VOMITING: 0
SHORTNESS OF BREATH: 1
ABDOMINAL PAIN: 0
CHEST TIGHTNESS: 1

## 2021-05-16 NOTE — ED NOTES
Ambulation in room and pt oxygen dropped to 90 % on RA , pt noted to have labored breathing and increased cough.        SpO2 @ Rest on RA=  94%  Ambulation Sat on RA=  90%  Ambulation Sat on L O2=  %NA       Isamar Thacker RN  05/16/21 [id number]

## 2021-05-16 NOTE — ED PROVIDER NOTES
chest tightness and shortness of breath. Cardiovascular: Negative for chest pain, leg swelling and syncope. Gastrointestinal: Negative for abdominal pain and vomiting. All other systems reviewed and are negative. Positives and Pertinent negatives as per HPI. PAST MEDICAL HISTORY     Past Medical History:   Diagnosis Date    Allergic rhinitis     Arrhythmia     CKD (chronic kidney disease) stage 3, GFR 30-59 ml/min 2008    Coronary artery disease involving native coronary artery of native heart with angina pectoris ([facility] [address] 3/15/2020    Diverticulitis     Diverticulitis     Fatigue     GERD (gastroesophageal reflux disease)     Hyperlipidemia     Hypertension 2004    Microalbuminuria 8/2011    Osteoarthritis 2006    Plantar fasciitis 7/2010    Testosterone deficiency 8/2011    Type 2 diabetes, uncontrolled, with renal manifestation ([facility] [address] 2004    Vitamin D deficiency          SURGICAL HISTORY     Past Surgical History:   Procedure Laterality Date    APPENDECTOMY      EYE SURGERY      SHOULDER SURGERY  2006    Rotator cuff         CURRENTMEDICATIONS       Previous Medications    ASCORBIC ACID (VITAMIN C) 250 MG CHEW    Take 250 mg by mouth daily    ASPIRIN 81 MG TABLET    Take 81 mg by mouth daily. ATORVASTATIN (LIPITOR) 80 MG TABLET    Take 1 tablet by mouth nightly    CARVEDILOL (COREG) 12.5 MG TABLET    TAKE 1 TABLET BY MOUTH  TWICE DAILY    CLOPIDOGREL (PLAVIX) 75 MG TABLET    Take 1 tablet by mouth daily    DULAGLUTIDE (TRULICITY SC)    Inject 1.5 pens into the skin once a week    EMPAGLIFLOZIN (JARDIANCE) 25 MG TABLET    Take 25 mg by mouth daily    FENOFIBRATE MICRONIZED (LOFIBRA) 134 MG CAPSULE    Take 1 capsule by mouth every morning (before breakfast) for 360 days.     FLUTICASONE (FLONASE) 50 MCG/ACT NASAL SPRAY    2 sprays by Nasal route daily as needed for Rhinitis or Allergies     FUROSEMIDE (LASIX) 20 MG TABLET    Take 1 tablet by mouth daily    GLUCOSAMINE 500 MG CAPS    Take 500 mg by mouth daily    INSULIN LISPRO (HUMALOG) 100 UNIT/ML INJECTION VIAL    Inject 15 Units into the skin 3 times daily (before meals)    INSULIN LISPRO PROTAMINE & LISPRO (HUMALOG MIX) (75-25) 100 UNIT PER ML SUSP INJECTION VIAL    Inject 80 Units into the skin 2 times daily (with meals)    INSULIN PEN NEEDLE (B-D ULTRAFINE III SHORT PEN) 31G X 8 MM MISC    1 each 5 times daily. METFORMIN, MOD, (GLUMETZA) 500 MG EXTENDED RELEASE TABLET    Take 500 mg by mouth 2 times daily (with meals)     METHYLCELLULOSE, LAXATIVE, (CITRUCEL PO)    Take by mouth    MULTIPLE VITAMINS-MINERALS (THERAPEUTIC MULTIVITAMIN-MINERALS) TABLET    Take 1 tablet by mouth daily    OMEGA-3 ACID ETHYL ESTERS (LOVAZA) 1 G CAPSULE    Take 2 capsules by mouth 2 times daily.     SACUBITRIL-VALSARTAN (ENTRESTO) 49-51 MG PER TABLET    Take 1 tablet by mouth 2 times daily    TURMERIC CURCUMIN PO    Take by mouth         ALLERGIES     No known allergies    FAMILYHISTORY       Family History   Problem Relation Age of Onset    Glaucoma Mother     Heart Disease Father     Heart Attack Father     Cancer Maternal Grandmother     Cancer Maternal Grandfather           SOCIAL HISTORY       Social History     Socioeconomic History    Marital status:      Spouse name: Not on file    Number of children: Not on file    Years of education: Not on file    Highest education level: Not on file   Occupational History    Not on file   Tobacco Use    Smoking status: Former Smoker    Smokeless tobacco: Never Used   Vaping Use    Vaping Use: Never used   Substance and Sexual Activity    Alcohol use: No    Drug use: No    Sexual activity: Yes     Partners: Female   Other Topics Concern    Not on file   Social History Narrative    Not on file     Social Determinants of Health     Financial Resource Strain:     Difficulty of Paying Living Expenses:    Food Insecurity:     Worried About Running Out of Food in the Last Year:     Ran Out of Food in the Last Year:    Transportation Needs:     Lack of Transportation (Medical):  Lack of Transportation (Non-Medical):    Physical Activity:     Days of Exercise per Week:     Minutes of Exercise per Session:    Stress:     Feeling of Stress :    Social Connections:     Frequency of Communication with Friends and Family:     Frequency of Social Gatherings with Friends and Family:     Attends [employer] Services:     Active Member of Clubs or Organizations:     Attends Club or Organization Meetings:     Marital Status:    Intimate Partner Violence:     Fear of Current or Ex-Partner:     Emotionally Abused:     Physically Abused:     Sexually Abused:        SCREENINGS    [place] Coma Scale  Eye Opening: Spontaneous  Best Verbal Response: Oriented  Best Motor Response: Obeys commands  [place] Coma Scale Score: 15        PHYSICAL EXAM    (up to 7 for level 4, 8 or more for level 5)     ED Triage Vitals [05/16/21 1740]   BP Temp Temp Source Pulse Resp SpO2 Height Weight   -- 98.4 °F (36.9 °C) Oral 100 26 93 % 5' 9\" (1.753 m) 255 lb (115.7 kg)       Physical Exam  Vitals and nursing note reviewed. Constitutional:       Appearance: He is well-developed. He is obese. He is ill-appearing. HENT:      Head: Normocephalic and atraumatic. Mouth/Throat:      Mouth: Mucous membranes are moist.      Pharynx: Oropharynx is clear. No pharyngeal swelling or posterior oropharyngeal erythema. Eyes:      Conjunctiva/sclera: Conjunctivae normal.      Pupils: Pupils are equal, round, and reactive to light. Cardiovascular:      Rate and Rhythm: Normal rate and regular rhythm. Pulses:           Posterior tibial pulses are 2+ on the right side and 2+ on the left side. Heart sounds: Normal heart sounds. Pulmonary:      Effort: Tachypnea present. Breath sounds: No stridor. Wheezing (expiratory) present. No rales.    Abdominal:      General: Bowel sounds are normal.      Palpations: Abdomen is soft. Abdomen is not rigid. Tenderness: There is no abdominal tenderness. There is no guarding or rebound. Musculoskeletal:         General: Normal range of motion. Cervical back: Normal range of motion and neck supple. Right lower leg: No edema. Left lower leg: No edema. Comments: No calf tenderness or leg swelling   Skin:     General: Skin is warm and dry. Neurological:      Mental Status: He is alert and oriented to person, place, and time. GCS: GCS eye subscore is 4. GCS verbal subscore is 5. GCS motor subscore is 6. Cranial Nerves: No cranial nerve deficit. Sensory: No sensory deficit. Motor: No abnormal muscle tone. Coordination: Coordination normal.   Psychiatric:         Speech: Speech normal.         Behavior: Behavior normal.         Thought Content:  Thought content normal.         DIAGNOSTIC RESULTS   LABS:    Labs Reviewed   LACTATE, SEPSIS - Abnormal; Notable for the following components:       Result Value    Lactic Acid, Sepsis 2.1 (*)     All other components within normal limits    Narrative:     Performed at:  [place] [address],  [facility]   Phone [phone]   LACTATE, SEPSIS - Abnormal; Notable for the following components:    Lactic Acid, Sepsis 2.3 (*)     All other components within normal limits    Narrative:     Performed at:  [place] [address],  [facility]   Phone [phone]   PROCALCITONIN - Abnormal; Notable for the following components:    Procalcitonin 0.16 (*)     All other components within normal limits    Narrative:     Performed at:  [place] [address],  [facility]   Phone [phone]   CBC WITH AUTO DIFFERENTIAL - Abnormal; Notable for the following components:    RDW 15.6 (*)     Neutrophils Absolute 8.1 (*)     Lymphocytes Absolute 0.0 (*) DISPOSITION/PLAN   DISPOSITION     PATIENT REFERREDTO:  No follow-up provider specified.     DISCHARGE MEDICATIONS:  New Prescriptions    No medications on file       DISCONTINUED MEDICATIONS:  Discontinued Medications    No medications on file              (Please note that portions ofthis note were completed with a voice recognition program.  Efforts were made to edit the dictations but occasionally words are mis-transcribed.)    Salvador Gardner PA-C (electronically signed)           Zafar Krause PA-C  05/16/21 [id number]

## 2021-05-17 PROBLEM — J12.82 PNEUMONIA DUE TO COVID-19 VIRUS: Status: ACTIVE | Noted: 2021-05-16

## 2021-05-17 LAB
ESTIMATED AVERAGE GLUCOSE: 180 MG/DL
GLUCOSE BLD-MCNC: 204 MG/DL (ref 70–99)
GLUCOSE BLD-MCNC: 262 MG/DL (ref 70–99)
GLUCOSE BLD-MCNC: 291 MG/DL (ref 70–99)
GLUCOSE BLD-MCNC: 346 MG/DL (ref 70–99)
HBA1C MFR BLD: 7.9 %
LACTIC ACID: 1.2 MMOL/L (ref 0.4–2)
PERFORMED ON: ABNORMAL

## 2021-05-17 PROCEDURE — 97166 OT EVAL MOD COMPLEX 45 MIN: CPT

## 2021-05-17 PROCEDURE — 99255 IP/OBS CONSLTJ NEW/EST HI 80: CPT | Performed by: INTERNAL MEDICINE

## 2021-05-17 PROCEDURE — 6370000000 HC RX 637 (ALT 250 FOR IP): Performed by: INTERNAL MEDICINE

## 2021-05-17 PROCEDURE — 1200000000 HC SEMI PRIVATE

## 2021-05-17 PROCEDURE — XW033E5 INTRODUCTION OF REMDESIVIR ANTI-INFECTIVE INTO PERIPHERAL VEIN, PERCUTANEOUS APPROACH, NEW TECHNOLOGY GROUP 5: ICD-10-PCS | Performed by: INTERNAL MEDICINE

## 2021-05-17 PROCEDURE — 2500000003 HC RX 250 WO HCPCS: Performed by: INTERNAL MEDICINE

## 2021-05-17 PROCEDURE — 97535 SELF CARE MNGMENT TRAINING: CPT

## 2021-05-17 PROCEDURE — 94761 N-INVAS EAR/PLS OXIMETRY MLT: CPT

## 2021-05-17 PROCEDURE — 83605 ASSAY OF LACTIC ACID: CPT

## 2021-05-17 PROCEDURE — 36415 COLL VENOUS BLD VENIPUNCTURE: CPT

## 2021-05-17 PROCEDURE — 2700000000 HC OXYGEN THERAPY PER DAY

## 2021-05-17 PROCEDURE — 97161 PT EVAL LOW COMPLEX 20 MIN: CPT

## 2021-05-17 PROCEDURE — 99232 SBSQ HOSP IP/OBS MODERATE 35: CPT | Performed by: INTERNAL MEDICINE

## 2021-05-17 PROCEDURE — 97530 THERAPEUTIC ACTIVITIES: CPT

## 2021-05-17 PROCEDURE — 2580000003 HC RX 258: Performed by: INTERNAL MEDICINE

## 2021-05-17 PROCEDURE — 6360000002 HC RX W HCPCS: Performed by: INTERNAL MEDICINE

## 2021-05-17 RX ORDER — ALBUTEROL SULFATE 90 UG/1
2 AEROSOL, METERED RESPIRATORY (INHALATION) EVERY 4 HOURS PRN
Status: DISCONTINUED | OUTPATIENT
Start: 2021-05-17 | End: 2021-05-17

## 2021-05-17 RX ORDER — ALBUTEROL SULFATE 90 UG/1
2 AEROSOL, METERED RESPIRATORY (INHALATION) EVERY 4 HOURS PRN
Status: DISCONTINUED | OUTPATIENT
Start: 2021-05-17 | End: 2021-05-20 | Stop reason: HOSPADM

## 2021-05-17 RX ADMIN — INSULIN GLARGINE 40 UNITS: 100 INJECTION, SOLUTION SUBCUTANEOUS at 00:17

## 2021-05-17 RX ADMIN — ENOXAPARIN SODIUM 30 MG: 30 INJECTION SUBCUTANEOUS at 21:19

## 2021-05-17 RX ADMIN — ASPIRIN 81 MG: 81 TABLET, CHEWABLE ORAL at 08:29

## 2021-05-17 RX ADMIN — SERTRALINE HYDROCHLORIDE 50 MG: 50 TABLET ORAL at 08:30

## 2021-05-17 RX ADMIN — CARVEDILOL 12.5 MG: 6.25 TABLET, FILM COATED ORAL at 08:30

## 2021-05-17 RX ADMIN — SACUBITRIL AND VALSARTAN 1 TABLET: 49; 51 TABLET, FILM COATED ORAL at 09:12

## 2021-05-17 RX ADMIN — FENOFIBRATE 160 MG: 160 TABLET ORAL at 08:29

## 2021-05-17 RX ADMIN — SODIUM CHLORIDE: 9 INJECTION, SOLUTION INTRAVENOUS at 00:13

## 2021-05-17 RX ADMIN — Medication 2000 UNITS: at 08:29

## 2021-05-17 RX ADMIN — INSULIN LISPRO 6 UNITS: 100 INJECTION, SOLUTION INTRAVENOUS; SUBCUTANEOUS at 21:30

## 2021-05-17 RX ADMIN — SODIUM CHLORIDE, PRESERVATIVE FREE 10 ML: 5 INJECTION INTRAVENOUS at 00:21

## 2021-05-17 RX ADMIN — ATORVASTATIN CALCIUM 80 MG: 40 TABLET, FILM COATED ORAL at 21:19

## 2021-05-17 RX ADMIN — INSULIN GLARGINE 40 UNITS: 100 INJECTION, SOLUTION SUBCUTANEOUS at 21:30

## 2021-05-17 RX ADMIN — CARVEDILOL 12.5 MG: 6.25 TABLET, FILM COATED ORAL at 17:14

## 2021-05-17 RX ADMIN — REMDESIVIR 100 MG: 100 INJECTION, POWDER, LYOPHILIZED, FOR SOLUTION INTRAVENOUS at 21:20

## 2021-05-17 RX ADMIN — ACETAMINOPHEN 650 MG: 325 TABLET ORAL at 21:19

## 2021-05-17 RX ADMIN — ENOXAPARIN SODIUM 30 MG: 30 INJECTION SUBCUTANEOUS at 08:30

## 2021-05-17 RX ADMIN — ENOXAPARIN SODIUM 30 MG: 30 INJECTION SUBCUTANEOUS at 00:16

## 2021-05-17 RX ADMIN — CLOPIDOGREL BISULFATE 75 MG: 75 TABLET ORAL at 08:30

## 2021-05-17 RX ADMIN — REMDESIVIR 200 MG: 100 INJECTION, POWDER, LYOPHILIZED, FOR SOLUTION INTRAVENOUS at 01:23

## 2021-05-17 RX ADMIN — DEXAMETHASONE 6 MG: 4 TABLET ORAL at 08:30

## 2021-05-17 RX ADMIN — SODIUM CHLORIDE, PRESERVATIVE FREE 10 ML: 5 INJECTION INTRAVENOUS at 22:28

## 2021-05-17 RX ADMIN — Medication 1 TABLET: at 08:29

## 2021-05-17 RX ADMIN — INSULIN LISPRO 6 UNITS: 100 INJECTION, SOLUTION INTRAVENOUS; SUBCUTANEOUS at 00:17

## 2021-05-17 RX ADMIN — INSULIN HUMAN 8 UNITS: 100 INJECTION, SOLUTION PARENTERAL at 00:17

## 2021-05-17 ASSESSMENT — PAIN SCALES - GENERAL
PAINLEVEL_OUTOF10: 0
PAINLEVEL_OUTOF10: 3

## 2021-05-17 NOTE — PROGRESS NOTES
Pt resting with eyes closed, respirs witnessed as e/e, no signs of distress. SR up x2, bed in lowest  position, wheels locked. Call light and bedside table in easy reach.    Ankit Schofield, RN, RN

## 2021-05-17 NOTE — PROGRESS NOTES
Patient admitted to room 205-2 from Dr. Anil Cornejo. Patient oriented to room, call light, bed rails, phone, lights and bathroom. Patient instructed about the schedule of the day including: vital sign frequency, lab draws, possible tests, frequency of MD and staff rounds, daily weights, I &O's and prescribed diet. Bed alarm deferred patient lmed fall risk alert with steady gait. Telemetry box #33 in place, patient aware of placement and reason. Bed locked, in lowest position, side rails up 2/4, call light within reach. Recliner Assessment:     Patient is able to demonstrate the ability to move from a reclining position to an upright position within the recliner. 4 Eyes Skin Assessment:     The patient is being assess for   Admission    I agree that 2 RN's have performed a thorough Head to Toe Skin Assessment on the patient. ALL assessment sites listed below have been assessed. Areas assessed by both nurses:   [x]   Head, Face, and Ears   [x]   Shoulders, Back, and Chest, Abdomen  [x]   Arms, Elbows, and Hands   [x]   Coccyx, Sacrum, and Ischium  [x]   Legs, Feet, and Heels  Scattered bruising on arms from needle sticks and IV. Small scratch on R hand       Co-signer eSignature: Electronically signed by Ankit Schofield RN on 5/16/21 at 11:53 PM EDT    Does the Patient have Skin Breakdown?   No          Izaiah Prevention initiated:  No   Wound Care Orders initiated:  No      [facility] nurse consulted for Pressure Injury (Stage 3,4, Unstageable, DTI, NWPT, Complex wounds)and New or Established Ostomies:  No      Primary Nurse eSignature: Electronically signed by Bairon Albert RN on 5/16/21 at 11:53 PM EDT

## 2021-05-17 NOTE — CARE COORDINATION
Case Management Assessment  Initial Evaluation      Patient Name: Kobe Shipman  YOB: 1960  Diagnosis: Acute hypoxemic respiratory failure due to COVID-19 [facility]) [U07.1, J96.01]  Date / Time: 5/16/2021  5:29 PM    Admission status/Date:  05/16/2021  Chart Reviewed: Yes      Patient Interviewed: Yes   Family Interviewed:  No      Hospitalization in the last 30 days:  No      Health Care Decision Maker :   Primary Decision Maker: Julio Trejo - [phone]       Met with: patient  Amie De Oliveira conducted  (bedside/phone): phone due to C19 restrictions    Current PCP: Antonia Francisco MD    [id number] [employer])  Precert required for SNF : Liana Quintero          3 night stay required - Y, N    ADLS  Support Systems/Care Needs: Children, Spouse/Significant Other  Transportation: self    Meal Preparation: self    Housing  Living Arrangements: 2 story house  Steps: NA  Intent for return to present living arrangements: Yes  Identified Issues: NA    Home Care Information  Active with Home Health Care : No Agency:(Services)  Type of Home Care Services: None  Passport/Waiver : No  :                      Phone Number:    Passport/Waiver Services: NA          Durable Medical Equiptment   DME Provider: ALIYA  Equipment: NA  Walker___Cane___RTS___ BSC___Shower Chair___Hospital Bed___W/C____Other________  02 at ____Liter(s)---wears(frequency)_______ HHN ___ CPAP___ BiPap___   N/A____      Home O2 Use :  No  - CM following for potential new home O2 needs R/T C19    Informed of need for care provider to bring portable home O2 tank on day of discharge for nursing to connect prior to leaving:   Not Indicated  Verbalized agreement/Understanding:   Not Indicated    Community Service Affiliation  Dialysis:  No    · Agency:  · Location:  · Dialysis Schedule:  · Phone:   · Fax:     Other Community Services: (ex:PT/OT,Mental Health,Wound Clinic, [address] [place] [address])    DISCHARGE PLAN: Explained Case Management role/services. Chart reviewed. Met with pt by phone and explained the role of the CM. Plans to return  Home. IPTA. CM will follow for potential home O2 needs r/t C19.

## 2021-05-17 NOTE — PLAN OF CARE
Problem: Isolation Precautions - Risk of Spread of Infection  Goal: Prevent transmission of infection  Outcome: Met This Shift     Problem: Infection:  Goal: Will remain free from infection  Description: Will remain free from infection  Outcome: Met This Shift     Problem: Safety:  Goal: Free from accidental physical injury  Description: Free from accidental physical injury  Outcome: Met This Shift     Problem: Daily Care:  Goal: Daily care needs are met  Description: Daily care needs are met  Outcome: Met This Shift     Problem: Pain:  Goal: Patient's pain/discomfort is manageable  Description: Patient's pain/discomfort is manageable  Outcome: Met This Shift     Problem: Skin Integrity:  Goal: Skin integrity will stabilize  Description: Skin integrity will stabilize  Outcome: Met This Shift

## 2021-05-17 NOTE — H&P
(TRULICITY SC) Inject 1.5 pens into the skin once a week   Yes Historical Provider, MD   atorvastatin (LIPITOR) 80 MG tablet Take 1 tablet by mouth nightly 4/20/21  Yes MONA Vazquez CNP   clopidogrel (PLAVIX) 75 MG tablet Take 1 tablet by mouth daily 4/14/21  Yes Theresa Borrego MD   carvedilol (COREG) 12.5 MG tablet TAKE 1 TABLET BY MOUTH  TWICE DAILY 4/8/21  Yes MONA Vazquez CNP   sacubitril-valsartan (ENTRESTO) 49-51 MG per tablet Take 1 tablet by mouth 2 times daily 4/8/21  Yes MONA Vazquez CNP   empagliflozin (JARDIANCE) 25 MG tablet Take 25 mg by mouth daily   Yes Historical Provider, MD   insulin lispro protamine & lispro (HUMALOG MIX) (75-25) 100 UNIT per ML SUSP injection vial Inject 80 Units into the skin 2 times daily (with meals)   Yes Historical Provider, MD   insulin lispro (HUMALOG) 100 UNIT/ML injection vial Inject 15 Units into the skin 3 times daily (before meals)   Yes Historical Provider, MD   metFORMIN, MOD, (GLUMETZA) 500 MG extended release tablet Take 500 mg by mouth 2 times daily (with meals)  10/3/16  Yes Historical Provider, MD   Glucosamine 500 MG CAPS Take 500 mg by mouth daily   Yes Historical Provider, MD   aspirin 81 MG tablet Take 81 mg by mouth daily. Yes Historical Provider, MD   furosemide (LASIX) 20 MG tablet Take 1 tablet by mouth daily 4/20/21   MONA Vazquez CNP   Methylcellulose, Laxative, (CITRUCEL PO) Take by mouth    Historical Provider, MD   Multiple Vitamins-Minerals (THERAPEUTIC MULTIVITAMIN-MINERALS) tablet Take 1 tablet by mouth daily    Historical Provider, MD   TURMERIC CURCUMIN PO Take by mouth    Historical Provider, MD   Ascorbic Acid (VITAMIN C) 250 MG CHEW Take 250 mg by mouth daily    Historical Provider, MD   fenofibrate micronized (LOFIBRA) 134 MG capsule Take 1 capsule by mouth every morning (before breakfast) for 360 days.  6/13/12 4/14/21  Shruthi Ingram MD   Insulin Pen Needle (B-D ULTRAFINE III TECHNOLOGIST PROVIDED HISTORY: Reason for exam:->shortness of breath +COVID Reason for Exam: diagnosed with covid 5 days ago, took prednisone for a few days, still is having trouble breathing Acuity: Unknown Type of Exam: Unknown FINDINGS: The heart is unchanged. The pulmonary vessels are slightly engorged centrally and more prominent. There are hazy bibasilar ground-glass interstitial opacities which has increased. No effusion is seen     Minimal central pulmonary congestion. Hypoinflation with hazy bibasilar infiltrates and/or atelectasis which is more prominent.        EKG:    EKG 12 Lead [[id number]]    Collected: 05/16/21 1750    Updated: 05/16/21 1823     Ventricular Rate 96 BPM    Atrial Rate 96 BPM    P-R Interval 170 ms    QRS Duration 84 ms    Q-T Interval 366 ms    QTc Calculation (Bazett) 462 ms    P Axis 57 degrees    R Axis -25 degrees    T Axis 62 degrees    Diagnosis Normal sinus rhythmLow voltage QRSInferior infarct (cited on or before 14-MAR-2020)Anteroseptal infarct (cited on or before 14-MAR-2020)Abnormal ECGWhen compared with ECG of 14-MAR-2020 20:48,Questionable change in initial forces of Lateral leadsQuestionable change in initial forces of Inferior leadsST no longer elevated in Anterolateral leadsQT has lengthenedConfirmed by CELESTE FUENTES, [address] ([id number]) on 5/16/2021 6:23:19 PM     CBC:  Recent Labs     05/16/21 1816   WBC 8.3   HGB 14.3   HCT 42.2         RENAL  Recent Labs     05/16/21 1816      K 5.0   CL 99   CO2 24   BUN 36*   CREATININE 1.2   GLUCOSE 399*     Hemoglobin a1c:  Lab Results   Component Value Date    LABA1C 7.8 01/19/2021    LABA1C 8.3 04/13/2020    LABA1C 8.5 03/15/2020     LFT'S:  Recent Labs     05/16/21 1816   AST 31   ALT 38   BILITOT 0.4   ALKPHOS 65     CARDIAC ENZYMES:   Recent Labs     05/16/21 1816   TROPONINI <0.01     Lab Results   Component Value Date    PROBNP 178 (H) 05/16/2021    PROBNP 44 03/03/2021    PROBNP 1,278 (H) 03/14/2020     LACTIC ACID:  Recent Labs     05/16/21 1816 05/16/21  1946   LACTSEPSIS 2.1* 2.3*     U/A:  Recent Labs     05/16/21  1911   LEUKOCYTESUR Negative   COLORU Yellow   CLARITYU Clear   SPECGRAV <=1.005   BLOODU Negative   GLUCOSEU >=1000*     VBG:  Recent Labs     05/16/21 1816   PHVEN 7.384   LMG6SYJ 43.4   BDA6LDW 25.3   PO2VEN 40.7*   T0MXIBPE 75      Contains abnormal data SARS-COV-2 RNA, QL, RT PCR (COVID-19)   Ref Range & Units 6 d ago Comments   SARS-CoV-2 RNA (COVID-19), QUALITATIVE NAAT NOT DETECTED  DETECTEDAbnormal        A Detected result is considered a positive test result   for COVID-19.  This indicates that RNA from SARS-CoV-2   (formerly 2019-nCoV) was detected, and the patient is   infected with the virus and presumed to be contagious. If requested by public health authority, specimen will   be sent for additional testing. Please review the \"Fact Sheets\" and FDA authorized   labeling available for health care providers and   patients using the following websites:   [facility]/home/Covid-19/HCP/[employer]/fact-   sheet.html   [facility]/home/Covid-19/Patients/   [employer]/fact-sheet.html     This test has been authorized by the FDA under an   Emergency Use Authorization (EUA) for use by authorized   laboratories. Due to the current public health emergency, [facility] is receiving a high volume of samples from   a wide variety of swabs and media for COVID-19 testing. In order to serve patients during this public health   crisis, samples from appropriate clinical sources are   being tested.  Negative test results derived from   specimens received in non-commercially manufactured   viral collection and transport media, or in media and   sample collection kits not yet authorized by FDA for   COVID-19 testing should be cautiously evaluated and the   patient potentially subjected to extra precautions such   as additional clinical monitoring, including collection   of an additional specimen. Methodology:  Nucleic Acid Amplification Test (NAAT)   includes RT-PCR or TMA        Additional information about COVID-19 can be found   at the [employer] website:   www. [email or web address]. Resulting Agency  [facility]    Specimen Collected: 05/11/21  1:11 PM Last Resulted: 05/12/21  7:12 PM   Received From: Raina Gao  Result Received: 05/16/21  5:28 PM           PHYSICIAN CERTIFICATION  I certify that Veena Bhagat is expected to be hospitalized for 2 midnights based on the following assessment and plan:    ASSESSMENT/PLAN:  1. Acute respiratory failure with hypoxia, likely related to COVID PNA, supplemental O2 to maintain SPO2 ? 92%, continuous pulse ox. Satting 88% on RA in the ER. Currently requiring 2 L O2. Patient normally not on O2 at home. Wean as tolerated. Pulm c/s. 2. Probable sepsis (HR, RR), likely related to COVID. PCT minimally positive so will place on ceftriaxone and doxy IV. F/u bld and resp cx.    3. COVID PNA, Dx 5/11 at [facility].  Increasingly symptomatic. Droplet +, intermediate dose Lx, remdesivir. No CCP since > 72h since onset. 4. DM2, poor control initial . Per med rec pt is on 80 u BID 75/25 Humalog mix and 15 u humalog AC. He feels quite ill and is not eating well. D/w pharmacist.  Have reservations about giving this much insulin in a pt who doesn't feel well and is not eating very well. Adgusted regimen to 40 nightly Lantus and High dose SSI. Will give 8u regular IV now to bring down current values. Hold oral Rx, chk A1c. DVT Prophylaxis: intermediate Lx  Diet: CC  Code Status: Full Code   PT/OT Eval Status: Will order if needed and as patient condition allows  Dispo - Admit to inpatient    Dorothy Tamayo MD    Thank you Zeenat Amin MD for the opportunity to be involved in this patient's care.  If you have any questions or concerns please feel free to contact me via the [employer] Service at [phone]. This chart was generated using the [address] [place] [address] [address]  dictation system. I created this record but it may contain dictation errors given the limitations of this technology.

## 2021-05-17 NOTE — FLOWSHEET NOTE
05/17/21 1400   Vital Signs   Temp 97.8 °F (36.6 °C)   Temp Source Oral   Pulse 72   Heart Rate Source Monitor   Resp 18   /80   BP Location Right upper arm   Level of Consciousness Alert (0)   MEWS Score 1   Patient Currently in Pain Denies   Pain Assessment   Pain Assessment 0-10   Pain Level 0   Oxygen Therapy   SpO2 95 %   O2 Device Nasal cannula   O2 Flow Rate (L/min) 2 L/min   Patient up in room. SpO2 = 95% on 2 L/min of Oxygen. Denies any complains of pain or shortness of breath. Pt resting in bed quietly at this time. Denies any needs. All needs and call light within reach.

## 2021-05-17 NOTE — PROGRESS NOTES
RESPIRATORY THERAPY ASSESSMENT    Name:  AURELIO Garcia Record Number:  [id number]  Age: 61 y.o. Gender: male  : 1960  Today's Date:  2021  Room:  [address] [place], [place] [address]-    Assessment     Is the patient being admitted for a COPD or Asthma exacerbation? No   (If yes the patient will be seen every 4 hours for the first 24 hours and then reassessed)    Patient Admission Diagnosis      Allergies  Allergies   Allergen Reactions    No Known Allergies        Minimum Predicted Vital Capacity:               Actual Vital Capacity:                    Pulmonary History:CHF/Pulmonary Edema  Home Oxygen Therapy:  room air  Home Respiratory Therapy:None   Current Respiratory Therapy:  Albuterol 2PUFFS Q4W/A & Q6PRN,  Atorvent 2PUFFS Q4W/A. Respiratory Severity Index(RSI)   Patients with orders for inhalation medications, oxygen, or any therapeutic treatment modality will be placed on Respiratory Protocol. They will be assessed with the first treatment and at least every 72 hours thereafter. The following severity scale will be used to determine frequency of treatment intervention.     Smoking History: Pulmonary Disease or Smoking History, Greater than 15 pack year = 2    Social History  Social History     Tobacco Use    Smoking status: Former Smoker    Smokeless tobacco: Never Used   Vaping Use    Vaping Use: Never used   Substance Use Topics    Alcohol use: No    Drug use: No       Recent Surgical History: None = 0  Past Surgical History  Past Surgical History:   Procedure Laterality Date    APPENDECTOMY      EYE SURGERY      SHOULDER SURGERY  2006    Rotator cuff       Level of Consciousness: Alert, Oriented, and Cooperative = 0    Level of Activity: Walking unassisted = 0    Respiratory Pattern: Regular Pattern; RR 8-20 = 0    Breath Sounds: Diminshed bilaterally and/or crackles = 2    Sputum   ,  ,    Cough: Strong, spontaneous, non-productive = 0    Vital Signs   BP (!) 142/80   Pulse 94 Temp 98 °F (36.7 °C) (Oral)   Resp 18   Ht 5' 9\" (1.753 m)   Wt 249 lb 6.4 oz (113.1 kg)   SpO2 95%   BMI 36.83 kg/m²   SPO2 (COPD values may differ): 90-91% on room air or greater than 92% on FiO2 24- 28% = 1    Peak Flow (asthma only): not applicable = 0    RSI: 5-6 = Q4hr PRN (every four hours as needed) for dyspnea        Plan       Goals: medication delivery    Patient/caregiver was educated on the proper method of use for Respiratory Care Devices:        Level of patient/caregiver understanding able to:   ? Verbalize understanding   ? Demonstrate understanding       ? Teach back        ? Needs reinforcement       ? No available caregiver               ? Other:     Response to education:       Is patient being placed on Home Treatment Regimen? No     Does the patient have everything they need prior to discharge? NA     Comments: Chart reviewed and patient assessed. Plan of Care: Albuterol 2PUFFS Q4PRN & Q6PRN,  Atrovent 2PUFFS Q4PRN. Electronically signed by Skyler Sandra RCP on 5/17/2021 at 12:16 AM    Respiratory Protocol Guidelines     1. Assessment and treatment by Respiratory Therapy will be initiated for medication and therapeutic interventions upon initiation of aerosolized medication. 2. Physician will be contacted for respiratory rate (RR) greater than 35 breaths per minute. Therapy will be held for heart rate (HR) greater than 140 beats per minute, pending direction from physician. 3. Bronchodilators will be administered via Metered Dose Inhaler (MDI) with spacer when the following criteria are met:  a. Alert and cooperative     b. HR < 140 bpm  c. RR < 30 bpm                d. Can demonstrate a 2-3 second inspiratory hold  4. Bronchodilators will be administered via Hand Held Nebulizer [facility]) to patients when ANY of the following criteria are met  a. Incognizant or uncooperative          b. Patients treated with HHN at Home        c.  Unable to demonstrate proper use of MDI with spacer     d. RR > 30 bpm   5. Bronchodilators will be delivered via Metered Dose Inhaler (MDI), HHN, Aerogen to intubated patients on mechanical ventilation. 6. Inhalation medication orders will be delivered and/or substituted as outlined below. Aerosolized Medications Ordering and Administration Guidelines:    1. All Medications will be ordered by a physician, and their frequency and/or modality will be adjusted as defined by the patients Respiratory Severity Index (RSI) score. 2. If the patient does not have documented COPD, consider discontinuing anticholinergics when RSI is less than 9.  3. If the bronchospasm worsens (increased RSI), then the bronchodilator frequency can be increased to a maximum of every 4 hours. If greater than every 4 hours is required, the physician will be contacted. 4. If the bronchospasm improves, the frequency of the bronchodilator can be decreased, based on the patient's RSI, but not less than home treatment regimen frequency. 5. Bronchodilator(s) will be discontinued if patient has a RSI less than 9 and has received no scheduled or as needed treatment for 72  Hrs. Patients Ordered on a Mucolytic Agent:    1. Must always be administered with a bronchodilator. 2. Discontinue if patient experiences worsened bronchospasm, or secretions have lessened to the point that the patient is able to clear them with a cough. Anti-inflammatory and Combination Medications:    1. If the patient lacks prior history of lung disease, is not using inhaled anti-inflammatory medication at home, and lacks wheezing by examination or by history for at least 24 hours, contact physician for possible discontinuation.

## 2021-05-17 NOTE — PROGRESS NOTES
Inpatient Occupational Therapy  Evaluation and Treatment    Unit: [address]  Date:  5/17/2021  Patient Name:    Teresa Sullivan  Admitting diagnosis:  Acute hypoxemic respiratory failure due to COVID-19 [facility]) [U07.1, J96.01]  Admit Date:  5/16/2021  Precautions/Restrictions/WB Status/ Lines/ Wounds/ Oxygen: Lines -IV and Supplemental O2 (2 liters )    Treatment Time:  5210-3319   Treatment Number: 1   Timed code treatment minutes 25 minutes   Total Treatment minutes:   35   minutes    Patient Goals for Therapy:  \" go home  \"      Discharge Recommendations: Home independently and PRN assist   DME needs for discharge: Shower Chair       Therapy recommendations for staff: Independent with use of No AD for all ambulation to/from bathroom    History of Present Illness: per H&P on 5-16-21 Sedrick WHITAKER    61 y.o. male presenting to the emergency department for evaluation of shortness of breath. Tested positive for COVID-19 5 days ago. States he did have body aches diarrhea has resolved states has lingering dry cough and shortness of breath feels like his air is being cut off feels like bronchospasm. PCP called in prednisone has been taking for the past 4 days not helping. Recent diagnosis of CHF and taking Lasix 20 mg a day. Oxygen saturation had been in the low 90s and today went to 89%. No fever. No chest pain. No abdominal pain. Former smoker quit 20 years ago. The history is provided by the patient. Home Health S4 Level Recommendation:  NA  AM-PAC Score: 23    Preadmission Environment    Pt. Lives with spouse  Home environment:  two story home  Steps to enter first floor: 3 steps to enter  Steps to second floor: Full flight of 12-13 with railing  Bathroom: tub/shower unit  Equipment owned: none    Preadmission Status:  Pt. Able to drive: Yes  Pt Fully independent with ADLs: Yes  Pt. Required assistance from family for:  Independent PTA  Pt. independent for transfers and gait and walked with No Device  History of falls No    Pain  No  Rating:NA  Location:NA  Pain Medicine Status: No request made      Cognition    A&O x4   Able to follow 2 step commands    Subjective  Patient lying supine in bed with no family present. Pt agreeable to this OT eval & tx. Upper Extremity ROM:    WFL    Upper Extremity Strength:    WFL    Upper Extremity Sensation    WFL    Upper Extremity Proprioception:  WFL    Coordination and Tone  WFL    Balance  Functional Sitting Balance:  WFL  Functional Standing Balance:WFL    Bed mobility:    Supine to sit: Independent  Sit to supine:   Independent  Rolling:    Independent  Scooting in sitting:  Independent  Scooting to head of bed:   Not Tested    Bridging:   Not Tested    Transfers:    Sit to stand:  Independent  Stand to sit: Independent  Bed to chair:   Independent  Standard toilet: Independent  Bed to [facility]:  Not Tested    Dressing:      UE:   Not Tested  LE:    Supervision    Bathing:    UE:  Not Tested  LE:  Not Tested    Eating:   Independent    Toileting:  Independent    Activity Tolerance   Pt completed therapy session with SOB , coughing, fatigue    Positioning Needs:   Pt up in chair, no alarm needed, positioned in proper neutral alignment and pressure relief provided. Exercise / Activities Initiated:   N/A    Patient/Family Education:   Role of OT    Assessment of Deficits: Pt seen for Occupational therapy evaluation in acute care setting. Pt demonstrated decreased Activity tolerance. Pt functioning below baseline and will likely benefit from skilled occupational therapy services to maximize safety and independence. Goal(s) : To be met in 3 Visits:  1). Bed to toilet/BSC: Independent    To be met in 5 Visits:  1). Supine to/from Sit:  Independent  2). Upper Body Bathing:   Independent  3). Lower Body Bathing:   Independent  4). Upper Body Dressing:  Independent  5). Lower Body Dressing:  Independent  6).  Pt to demonstrate UE exs x 15 reps with minimal cues    Rehabilitation Potential:  Good for goals listed above. Strengths for achieving goals include: Pt cooperative  Barriers to achieving goals include:  Complexity of condition     Plan: To be seen 2-3 x/wk  while in acute care setting for therapeutic exercises, bed mobility, transfers, dressing, bathing, family/patient education, ADL/IADL retraining, energy conservation training.      Irineo Pierre OTR/L [id number]            If patient discharges from this facility prior to next visit, this note will serve as the Discharge Summary

## 2021-05-17 NOTE — ACP (ADVANCE CARE PLANNING)
Advance Care Planning   Healthcare Decision Maker:    Primary Decision Maker: Parth Lopez - [phone]    Click here to complete Healthcare Decision Makers including selection of the Healthcare Decision Maker Relationship (ie \"Primary\").

## 2021-05-17 NOTE — FLOWSHEET NOTE
05/17/21 0815   Vital Signs   Temp 97.8 °F (36.6 °C)   Temp Source Oral   Pulse 84   Heart Rate Source Monitor   Resp 18   /77   BP Location Right upper arm   Patient Position Semi fowlers   Level of Consciousness Alert (0)   MEWS Score 1   Patient Currently in Pain Denies   Pain Assessment   Pain Assessment 0-10   Pain Level 0   Oxygen Therapy   SpO2 95 %   O2 Device Nasal cannula   O2 Flow Rate (L/min) 2 L/min   Patient up in room, tolerating breakfast well. Denies any complains of. Dry cough noted at times. Spo2 = 95% on 2 L/min. Will continue to monitor.

## 2021-05-17 NOTE — PLAN OF CARE
Problem: Falls - Risk of:  Goal: Will remain free from falls  Outcome: Ongoing  Goal: Absence of physical injury  Outcome: Ongoing     Problem: Airway Clearance - Ineffective  Goal: Achieve or maintain patent airway  Outcome: Ongoing     Problem: Gas Exchange - Impaired  Goal: Absence of hypoxia  Outcome: Ongoing  Goal: Promote optimal lung function  Outcome: Ongoing     Problem: Breathing Pattern - Ineffective  Goal: Ability to achieve and maintain a regular respiratory rate  Outcome: Ongoing     Problem:  Body Temperature -  Risk of, Imbalanced  Goal: Ability to maintain a body temperature within defined limits  Outcome: Ongoing  Goal: Will regain or maintain usual level of consciousness  Outcome: Ongoing  Goal: Complications related to the disease process, condition or treatment will be avoided or minimized  Outcome: Ongoing     Problem: Isolation Precautions - Risk of Spread of Infection  Goal: Prevent transmission of infection  5/17/2021 0858 by Moisés Sexton RN  Outcome: Ongoing  5/17/2021 0044 by Maddie Mckee RN  Outcome: Met This Shift     Problem: Nutrition Deficits  Goal: Optimize nutritional status  Outcome: Ongoing     Problem: Risk for Fluid Volume Deficit  Goal: Maintain normal heart rhythm  Outcome: Ongoing  Goal: Maintain absence of muscle cramping  Outcome: Ongoing  Goal: Maintain normal serum potassium, sodium, calcium, phosphorus, and pH  Outcome: Ongoing     Problem: Loneliness or Risk for Loneliness  Goal: Demonstrate positive use of time alone when socialization is not possible  Outcome: Ongoing     Problem: Fatigue  Goal: Verbalize increase energy and improved vitality  Outcome: Ongoing     Problem: Patient Education: Go to Patient Education Activity  Goal: Patient/Family Education  Outcome: Ongoing     Problem: Infection:  Goal: Will remain free from infection  5/17/2021 0858 by Moisés Sexton RN  Outcome: Ongoing  5/17/2021 0044 by Maddie Mckee RN  Outcome: Met This Shift     Problem: Safety:  Goal: Free from accidental physical injury  5/17/2021 0858 by Frank Cueto RN  Outcome: Ongoing  5/17/2021 0044 by Patrick Nix RN  Outcome: Met This Shift  Goal: Free from intentional harm  Outcome: Ongoing     Problem: Daily Care:  Goal: Daily care needs are met  5/17/2021 0858 by Frank Cueto RN  Outcome: Ongoing  5/17/2021 0044 by Patrick Nix RN  Outcome: Met This Shift     Problem: Pain:  Goal: Patient's pain/discomfort is manageable  5/17/2021 0858 by Frank Cueto RN  Outcome: Ongoing  5/17/2021 0044 by Patrick Nix RN  Outcome: Met This Shift     Problem: Skin Integrity:  Goal: Skin integrity will stabilize  5/17/2021 0858 by Frank Cueto RN  Outcome: Ongoing  5/17/2021 0044 by Patrick Nix RN  Outcome: Met This Shift     Problem: Discharge Planning:  Goal: Patients continuum of care needs are met  Outcome: Ongoing

## 2021-05-17 NOTE — ED NOTES
Pt resting in bed denies needs. Oxygen at 2 liters nasal cannula for saturation below 90%.  Pt was at rest 89% on RA       Florian Grier RN  05/16/21 2019

## 2021-05-17 NOTE — PROGRESS NOTES
Progress Note    Admit Date:  5/16/2021    61year old male with DM type 2, OA, hypertension, hyperlipidemia, GERD, CAD, CKD, and CHF presented to [facility] with c/o shortness of breath, hypoxia, myalgias, cough, and diarrhea. Tested positive for COVID on 5/11. Admitted to [address] [place] [address] for Jose. Subjective:  Mr. Musa Hanley is now on 2 L of O2    Objective:   Patient Vitals for the past 4 hrs:   BP Temp Temp src Pulse Resp SpO2   05/17/21 0815 132/77 97.8 °F (36.6 °C) Oral 84 18 95 %        No intake or output data in the 24 hours ending 05/17/21 0918    Physical Exam:    Gen: No distress. Alert. Eyes: PERRL. No sclera icterus. No conjunctival injection. ENT: No discharge. Pharynx clear. Neck: No JVD. No Carotid Bruit. Trachea midline. Resp: No accessory muscle use. bibasilar crackles. No wheezes. No rhonchi. CV: Regular rate. Regular rhythm. No murmur. No rub. No edema. Capillary Refill: Brisk,< 3 seconds   Peripheral Pulses: +2 palpable, equal bilaterally   GI: Non-tender. Non-distended. No masses. No organomegaly. Normal bowel sounds. No hernia. Skin: Warm and dry. No nodule on exposed extremities. No rash on exposed extremities. M/S: No cyanosis. No joint deformity. No clubbing. Neuro: Awake. Grossly nonfocal    Psych: Oriented x 3. No anxiety or agitation      Data:  CBC:   Recent Labs     05/16/21 1816   WBC 8.3   HGB 14.3   HCT 42.2   MCV 88.0        BMP:   Recent Labs     05/16/21 1816      K 5.0   CL 99   CO2 24   BUN 36*   CREATININE 1.2     LIVER PROFILE:   Recent Labs     05/16/21 1816   AST 31   ALT 38   BILITOT 0.4   ALKPHOS 65     PT/INR: No results for input(s): PROTIME, INR in the last 72 hours. CULTURES  Blood: pending  Sputum: pending   Results for Sam Zhang (MRN [id number]) as of 5/17/2021 09:22   Ref. Range 5/11/2021 00:00   SARS-CoV-2 Unknown DETECTED (A)       RADIOLOGY  XR CHEST PORTABLE   Final Result   Minimal central pulmonary congestion. Hypoinflation with hazy bibasilar infiltrates and/or atelectasis which is   more prominent. Assessment/Plan:  Acute hypoxic respiratory failure  COVID pneumonia  - admitted to med-surg. Pulmonology consulted  - Decadron D# 2, Remdesivir D#1  - supplemental O2. Wean as tolerated  - on 2 L O2.   - Albuterol/Ipratropium inhalers prn.  - robitussin prn. Sepsis  - lactic acidosis, tachypnea  - due to above  - IVF's, trend lactic. Blood cx pending.  - Procal 0.16. DM type 2, with hyperglycemia  - monitor glucose. HgbA1C pending.  - continue Lantus 40 units nightly, high dose SSI coverage. CAD  - continue Aspirin, Plavix, Atorvastatin, Coreg    Hypertension  - BP stable. Continue Coreg, Entresto. Hyperlipidemia   - on Atorvastatin, Fenofibrate. CHF  - continue Entresto, Coreg. CKD stage 3  - stable. Monitor labs. Obesity  - Body mass index is 36.83 kg/m². - Recommended weight loss     DVT Prophylaxis: Lovenox  Diet: DIET CARB CONTROL; Carb Control: 4 carb choices (60 gms)/meal  Code Status: Full Code    CHANCE Whitley.

## 2021-05-17 NOTE — PLAN OF CARE
Problem: Falls - Risk of:  Goal: Will remain free from falls  Description: Will remain free from falls  5/17/2021 1938 by Geoff Moran RN  Outcome: Ongoing  5/17/2021 0858 by Zafar Kuhn RN  Outcome: Ongoing  Goal: Absence of physical injury  Description: Absence of physical injury  5/17/2021 1938 by Geoff Moran RN  Outcome: Ongoing  5/17/2021 0858 by Zafar Kuhn RN  Outcome: Ongoing     Problem: Airway Clearance - Ineffective  Goal: Achieve or maintain patent airway  5/17/2021 1938 by Geoff Moran RN  Outcome: Ongoing  5/17/2021 0858 by Zafar Kuhn RN  Outcome: Ongoing     Problem: Gas Exchange - Impaired  Goal: Absence of hypoxia  5/17/2021 1938 by Geoff Moran RN  Outcome: Ongoing  5/17/2021 0858 by Zafar Kuhn RN  Outcome: Ongoing  Goal: Promote optimal lung function  5/17/2021 1938 by Geoff Moran RN  Outcome: Ongoing  5/17/2021 0858 by Zafar Kuhn RN  Outcome: Ongoing     Problem: Breathing Pattern - Ineffective  Goal: Ability to achieve and maintain a regular respiratory rate  5/17/2021 1938 by Geoff Moran RN  Outcome: Ongoing  5/17/2021 0858 by Zafar Kuhn RN  Outcome: Ongoing     Problem:  Body Temperature -  Risk of, Imbalanced  Goal: Ability to maintain a body temperature within defined limits  5/17/2021 1938 by Geoff Moran RN  Outcome: Ongoing  5/17/2021 0858 by Zafar Kuhn RN  Outcome: Ongoing  Goal: Will regain or maintain usual level of consciousness  5/17/2021 1938 by Geoff Moran RN  Outcome: Ongoing  5/17/2021 0858 by Zafar Kuhn RN  Outcome: Ongoing  Goal: Complications related to the disease process, condition or treatment will be avoided or minimized  5/17/2021 1938 by Geoff Moran RN  Outcome: Ongoing  5/17/2021 0858 by Zafar Kuhn RN  Outcome: Ongoing     Problem: Isolation Precautions - Risk of Spread of Infection  Goal: Prevent transmission of infection  5/17/2021 1938 by Geoff Moran RN  Outcome: Ongoing  5/17/2021 0858 by Torito Lema RN  Outcome: Ongoing     Problem: Nutrition Deficits  Goal: Optimize nutritional status  5/17/2021 1938 by Kimberly Samano RN  Outcome: Ongoing  5/17/2021 0858 by Torito Lema RN  Outcome: Ongoing     Problem: Risk for Fluid Volume Deficit  Goal: Maintain normal heart rhythm  5/17/2021 1938 by Kimberly Samano RN  Outcome: Ongoing  5/17/2021 0858 by Torito Lema RN  Outcome: Ongoing  Goal: Maintain absence of muscle cramping  5/17/2021 1938 by Kimberly Samano RN  Outcome: Ongoing  5/17/2021 0858 by Torito Lema RN  Outcome: Ongoing  Goal: Maintain normal serum potassium, sodium, calcium, phosphorus, and pH  5/17/2021 1938 by Kimberly Samano RN  Outcome: Ongoing  5/17/2021 0858 by Torito Lema RN  Outcome: Ongoing     Problem: Fatigue  Goal: Verbalize increase energy and improved vitality  5/17/2021 1938 by Kimberly Samano RN  Outcome: Ongoing  5/17/2021 0858 by Torito Lema RN  Outcome: Ongoing     Problem: Infection:  Goal: Will remain free from infection  Description: Will remain free from infection  5/17/2021 1938 by Kimberly Samano RN  Outcome: Ongoing  5/17/2021 0858 by Torito Lema RN  Outcome: Ongoing     Problem: Safety:  Goal: Free from accidental physical injury  Description: Free from accidental physical injury  5/17/2021 1938 by Kimberly Samano RN  Outcome: Ongoing  5/17/2021 0858 by Torito Lema RN  Outcome: Ongoing  Goal: Free from intentional harm  Description: Free from intentional harm  5/17/2021 1938 by Kimberly Samano RN  Outcome: Ongoing  5/17/2021 0858 by Torito Lema RN  Outcome: Ongoing     Problem: Daily Care:  Goal: Daily care needs are met  Description: Daily care needs are met  5/17/2021 1938 by Kimberly Samano RN  Outcome: Ongoing  5/17/2021 0858 by Torito Lema RN  Outcome: Ongoing     Problem: Pain:  Goal: Patient's pain/discomfort is manageable  Description: Patient's pain/discomfort is manageable  5/17/2021 1938 by Serge Choudhary RN  Outcome: Ongoing  5/17/2021 0858 by Trisha Hernandez RN  Outcome: Ongoing     Problem: Skin Integrity:  Goal: Skin integrity will stabilize  Description: Skin integrity will stabilize  5/17/2021 1938 by Serge Choudhary RN  Outcome: Ongoing  5/17/2021 0858 by Trisha Hernandez RN  Outcome: Ongoing

## 2021-05-17 NOTE — PROGRESS NOTES
Inpatient Physical Therapy Evaluation and Treatment    Unit: [address]  Date:  5/17/2021  Patient Name:    Britt Leahy  Admitting diagnosis:  Acute hypoxemic respiratory failure due to COVID-19 [facility]) [U07.1, J96.01]  Admit Date:  5/16/2021  Precautions/Restrictions/WB Status/ Lines/ Wounds/ Oxygen: Fall risk, Bed/chair alarm and Lines -IV and Supplemental O2 (2L)     Treatment Time:  6920-1100  Treatment Number:  1   Timed Code Treatment Minutes: 25 minutes  Total Treatment Minutes:  35  minutes    Patient Goals for Therapy: \" to go home \"          Discharge Recommendations: Home PRN assist   DME needs for discharge: Needs Met       Therapy recommendation for EMS Transport: can transport by wheelchair    Therapy recommendations for staff:   Supervision with use of No AD for all transfers and ambulation within room    History of Present Illness: Per H&P Dr. Muriel Dalal 5/16: \"The patient is a 61 y.o. male with PMH below, presents with SOB/ROQUE, hypoxia, myalgias, dry cough, diarrhea. Pt reports that he was Dx w/ COVID on the 11th. He reports increasing SOB/ROQUE since, especially worse today so he came to the ER. He reports he has had associated diarrhea. He reports that his PCP put him on prednisone 4 days ago but it has not helped much. His pOx was in the 80's at home. He is requiring 2L O2 and he is not normally on O2. He was reportedly recently Dx w/ CHF and started on Lasix. He has had a few episodes of diarrhea. He has had associated myalgias, fatigue, dry cough. \"    Home Health S4 Level Recommendation:  NA  AM-PAC Mobility Score    AM-PAC Inpatient Mobility Raw Score : 23       Preadmission Environment    Pt.  Lives with spouse  Home environment:  two story home  Steps to enter first floor: 3 steps to enter and hand rail unilateral  Steps to second floor: Full flight of 12-13 with railing  Bathroom: tub/shower unit  Equipment owned: none    Preadmission Status:  Pt. Able to drive: Yes  Pt Fully independent with ADLs: Yes  Pt. Required assistance from family for: Independent PTA (wife does housework)  Pt. independent for transfers and gait and walked with No Device  History of falls No    Pain   No  Location: N/A  Rating: NA /10  Pain Medicine Status: No request made    Cognition    A&O x4   Able to follow 2 step commands    Subjective  Patient lying supine in bed with no family present. Pt agreeable to this PT eval & tx. Upper Extremity ROM/Strength  Please see OT evaluation. Lower Extremity ROM / Strength   AROM WFL: Yes    Strength Assessment (measured on a 0-5 scale):  R LE   Quad   4+   Ant Tib  4+   Hamstring 4+   Iliopsoas 4+  L LE  Quad   4+   Ant Tib  4+   Hamstring 4+   Iliopsoas 4+    Lower Extremity Sensation    Pt reports h/o intermittent neuropathy bilat feet. Lower Extremity Proprioception:   WFL    Coordination and Tone  WFL    Balance  Sitting:  Normal; Independent  Comments: at EOB    Standing: Good ; Supervision  Comments: without AD. Pt able to reach moderately outside SANTINO and rotate through trunk with UE reach. Bed Mobility   Supine to Sit:    Modified Independent with HOB elevated 30*  Sit to Supine:   Not Tested  Rolling:   Not Tested  Scooting in sitting: Not Tested  Scooting in supine:  Not Tested    Transfer Training     Sit to stand:   Supervision  Stand to sit:   Supervision  Bed to Chair:   Supervision with use of No AD    Gait gait completed as indicated below  Distance:      25 ft + 25 ft  Deviations (firm surface/linoleum):  decreased teresa and mild M/L sway  Assistive Device Used:    No AD  Level of Assist:    Supervision  Comment: therapist assisting with IV and O2 line    Stair Training deferred. Pt demonstrates adequate strength and balance to expect no issues. Activity Tolerance   Pt completed therapy session with SOB noted with exertion during ADLs and mobility. Intermittent coughing bouts.      BP (mmHg)  HR (bpm)  SpO2 (%)    Supine before activity

## 2021-05-17 NOTE — CONSULTS
Pharmacy to dose remdesivir per  [facility]. Patient is a 60 y/o M who tested positive for COVID-19 5 days ago and who presents to ED with hypoxemia and acute respiratory failure requiring oxygen supplementation. Remdesivir Day #1  WBC                BUN/SCr       CrCl                 ALT/AST           Tmax  8.3                    36/1.2              81                   38/31                 98.4      Remdesivir 200 mg IVPB x1 today, followed by remdesivir 100 mg IVPB q24h x 4 doses for a total of 5 days of therapy.
tablet Oral BID    sertraline  50 mg Oral Daily    sodium chloride flush  5-40 mL Intravenous 2 times per day    enoxaparin  30 mg Subcutaneous BID    insulin glargine  40 Units Subcutaneous Nightly    insulin lispro  0-18 Units Subcutaneous TID     insulin lispro  0-9 Units Subcutaneous Nightly    dexamethasone  6 mg Oral Daily    Vitamin D  2,000 Units Oral Daily     Continuous Infusions:   dextrose      sodium chloride      sodium chloride 75 mL/hr at 05/17/21 0013     PRN Meds:  albuterol sulfate HFA **AND** ipratropium **AND** MDI Treatment, glucose, dextrose, glucagon (rDNA), dextrose, sodium chloride flush, sodium chloride, promethazine **OR** ondansetron, polyethylene glycol, acetaminophen **OR** acetaminophen, albuterol sulfate HFA, guaiFENesin-dextromethorphan    ALLERGIES:  Patient is allergic to no known allergies. REVIEW OF SYSTEMS:  Constitutional: Negative for fever  HENT: Negative for sore throat  Eyes: Negative for redness   Respiratory: +dyspnea, cough  Cardiovascular: Negative for chest pain  Gastrointestinal: + diarrhea   Genitourinary: Negative for hematuria   Musculoskeletal: Negative for arthralgias   Skin: Negative for rash  Neurological: Negative for syncope  Hematological: Negative for adenopathy  Psychiatric/Behavorial: Negative for anxiety    PHYSICAL EXAM:  Blood pressure 115/62, pulse 90, temperature 97.2 °F (36.2 °C), temperature source Oral, resp. rate 18, height 5' 9\" (1.753 m), weight 249 lb 6.4 oz (113.1 kg), SpO2 92 %.' on 2LPM   Gen: No distress. Eyes: PERRL. No sclera icterus. No conjunctival injection. ENT: No discharge. Pharynx clear. Neck: Trachea midline. No obvious mass. Resp: No accessory muscle use. Few crackles. No wheezes. No rhonchi. No dullness on percussion. CV: Regular rate. Regular rhythm. No murmur or rub. No edema. GI: Non-tender. Non-distended. No hernia. Skin: Warm and dry. No nodule on exposed extremities. Lymph: No cervical LAD.

## 2021-05-18 LAB
ALBUMIN SERPL-MCNC: 3.3 G/DL (ref 3.4–5)
ALP BLD-CCNC: 54 U/L (ref 40–129)
ALT SERPL-CCNC: 40 U/L (ref 10–40)
ANION GAP SERPL CALCULATED.3IONS-SCNC: 10 MMOL/L (ref 3–16)
AST SERPL-CCNC: 28 U/L (ref 15–37)
BILIRUB SERPL-MCNC: 0.3 MG/DL (ref 0–1)
BILIRUBIN DIRECT: <0.2 MG/DL (ref 0–0.3)
BILIRUBIN, INDIRECT: ABNORMAL MG/DL (ref 0–1)
BUN BLDV-MCNC: 38 MG/DL (ref 7–20)
CALCIUM SERPL-MCNC: 8.4 MG/DL (ref 8.3–10.6)
CHLORIDE BLD-SCNC: 104 MMOL/L (ref 99–110)
CO2: 25 MMOL/L (ref 21–32)
CREAT SERPL-MCNC: 1.2 MG/DL (ref 0.8–1.3)
GFR AFRICAN AMERICAN: >60
GFR NON-AFRICAN AMERICAN: >60
GLUCOSE BLD-MCNC: 159 MG/DL (ref 70–99)
GLUCOSE BLD-MCNC: 206 MG/DL (ref 70–99)
GLUCOSE BLD-MCNC: 208 MG/DL (ref 70–99)
GLUCOSE BLD-MCNC: 278 MG/DL (ref 70–99)
GLUCOSE BLD-MCNC: 303 MG/DL (ref 70–99)
GLUCOSE BLD-MCNC: 338 MG/DL (ref 70–99)
HCT VFR BLD CALC: 40.6 % (ref 40.5–52.5)
HEMOGLOBIN: 13.3 G/DL (ref 13.5–17.5)
MCH RBC QN AUTO: 29.2 PG (ref 26–34)
MCHC RBC AUTO-ENTMCNC: 32.8 G/DL (ref 31–36)
MCV RBC AUTO: 89.1 FL (ref 80–100)
PDW BLD-RTO: 15.8 % (ref 12.4–15.4)
PERFORMED ON: ABNORMAL
PLATELET # BLD: 311 K/UL (ref 135–450)
PMV BLD AUTO: 6.7 FL (ref 5–10.5)
POTASSIUM SERPL-SCNC: 5 MMOL/L (ref 3.5–5.1)
RBC # BLD: 4.55 M/UL (ref 4.2–5.9)
SODIUM BLD-SCNC: 139 MMOL/L (ref 136–145)
TOTAL PROTEIN: 6.4 G/DL (ref 6.4–8.2)
WBC # BLD: 11.6 K/UL (ref 4–11)

## 2021-05-18 PROCEDURE — 2580000003 HC RX 258: Performed by: INTERNAL MEDICINE

## 2021-05-18 PROCEDURE — 99232 SBSQ HOSP IP/OBS MODERATE 35: CPT | Performed by: INTERNAL MEDICINE

## 2021-05-18 PROCEDURE — 6370000000 HC RX 637 (ALT 250 FOR IP): Performed by: INTERNAL MEDICINE

## 2021-05-18 PROCEDURE — 85027 COMPLETE CBC AUTOMATED: CPT

## 2021-05-18 PROCEDURE — 99233 SBSQ HOSP IP/OBS HIGH 50: CPT | Performed by: INTERNAL MEDICINE

## 2021-05-18 PROCEDURE — 80076 HEPATIC FUNCTION PANEL: CPT

## 2021-05-18 PROCEDURE — 6360000002 HC RX W HCPCS: Performed by: INTERNAL MEDICINE

## 2021-05-18 PROCEDURE — 80048 BASIC METABOLIC PNL TOTAL CA: CPT

## 2021-05-18 PROCEDURE — 1200000000 HC SEMI PRIVATE

## 2021-05-18 PROCEDURE — 2500000003 HC RX 250 WO HCPCS: Performed by: INTERNAL MEDICINE

## 2021-05-18 PROCEDURE — 36415 COLL VENOUS BLD VENIPUNCTURE: CPT

## 2021-05-18 RX ORDER — INSULIN GLARGINE 100 [IU]/ML
50 INJECTION, SOLUTION SUBCUTANEOUS NIGHTLY
Status: DISCONTINUED | OUTPATIENT
Start: 2021-05-18 | End: 2021-05-20 | Stop reason: HOSPADM

## 2021-05-18 RX ORDER — INSULIN GLARGINE 100 [IU]/ML
16 INJECTION, SOLUTION SUBCUTANEOUS EVERY MORNING
Status: DISCONTINUED | OUTPATIENT
Start: 2021-05-18 | End: 2021-05-20 | Stop reason: HOSPADM

## 2021-05-18 RX ADMIN — CLOPIDOGREL BISULFATE 75 MG: 75 TABLET ORAL at 09:04

## 2021-05-18 RX ADMIN — SERTRALINE HYDROCHLORIDE 50 MG: 50 TABLET ORAL at 09:03

## 2021-05-18 RX ADMIN — SACUBITRIL AND VALSARTAN 1 TABLET: 49; 51 TABLET, FILM COATED ORAL at 09:03

## 2021-05-18 RX ADMIN — INSULIN GLARGINE 16 UNITS: 100 INJECTION, SOLUTION SUBCUTANEOUS at 09:15

## 2021-05-18 RX ADMIN — ATORVASTATIN CALCIUM 80 MG: 40 TABLET, FILM COATED ORAL at 22:20

## 2021-05-18 RX ADMIN — CARVEDILOL 12.5 MG: 6.25 TABLET, FILM COATED ORAL at 09:04

## 2021-05-18 RX ADMIN — REMDESIVIR 100 MG: 100 INJECTION, POWDER, LYOPHILIZED, FOR SOLUTION INTRAVENOUS at 22:20

## 2021-05-18 RX ADMIN — INSULIN LISPRO 6 UNITS: 100 INJECTION, SOLUTION INTRAVENOUS; SUBCUTANEOUS at 22:25

## 2021-05-18 RX ADMIN — Medication 1 TABLET: at 09:04

## 2021-05-18 RX ADMIN — ACETAMINOPHEN 650 MG: 325 TABLET ORAL at 09:02

## 2021-05-18 RX ADMIN — ENOXAPARIN SODIUM 30 MG: 30 INJECTION SUBCUTANEOUS at 09:03

## 2021-05-18 RX ADMIN — INSULIN GLARGINE 50 UNITS: 100 INJECTION, SOLUTION SUBCUTANEOUS at 22:25

## 2021-05-18 RX ADMIN — ENOXAPARIN SODIUM 30 MG: 30 INJECTION SUBCUTANEOUS at 22:20

## 2021-05-18 RX ADMIN — CARVEDILOL 12.5 MG: 6.25 TABLET, FILM COATED ORAL at 17:05

## 2021-05-18 RX ADMIN — SODIUM CHLORIDE, PRESERVATIVE FREE 10 ML: 5 INJECTION INTRAVENOUS at 22:21

## 2021-05-18 RX ADMIN — DEXAMETHASONE 6 MG: 4 TABLET ORAL at 09:03

## 2021-05-18 RX ADMIN — FENOFIBRATE 160 MG: 160 TABLET ORAL at 09:04

## 2021-05-18 RX ADMIN — ASPIRIN 81 MG: 81 TABLET, CHEWABLE ORAL at 09:03

## 2021-05-18 RX ADMIN — SACUBITRIL AND VALSARTAN 1 TABLET: 49; 51 TABLET, FILM COATED ORAL at 22:20

## 2021-05-18 RX ADMIN — SODIUM CHLORIDE, PRESERVATIVE FREE 10 ML: 5 INJECTION INTRAVENOUS at 09:04

## 2021-05-18 RX ADMIN — Medication 2000 UNITS: at 09:03

## 2021-05-18 NOTE — PLAN OF CARE
Problem: Falls - Risk of:  Goal: Absence of physical injury  Description: Absence of physical injury  Outcome: Ongoing     Problem: Airway Clearance - Ineffective  Goal: Achieve or maintain patent airway  Outcome: Ongoing     Problem: Gas Exchange - Impaired  Goal: Absence of hypoxia  Outcome: Ongoing  Goal: Promote optimal lung function  Outcome: Ongoing

## 2021-05-18 NOTE — PROGRESS NOTES
DETECTED (A)       RADIOLOGY  XR CHEST PORTABLE   Final Result   Minimal central pulmonary congestion. Hypoinflation with hazy bibasilar infiltrates and/or atelectasis which is   more prominent. Assessment/Plan:  Acute hypoxic respiratory failure  COVID pneumonia  - admitted to med-surg. Pulmonology consulted  - Decadron D# 3, Remdesivir D#3  - supplemental O2. Wean as tolerated  - on 2 L O2.   - Albuterol/Ipratropium inhalers prn.  - robitussin prn. Sepsis  - lactic acidosis, tachypnea  - due to above  - IVF's, trend lactic. Blood cx pending.  - Procal 0.16. DM type 2, with hyperglycemia  - monitor glucose. HgbA1C pending.  - continue Lantus 40 units nightly, high dose SSI coverage. Increase lantus dose     CAD  - continue Aspirin, Plavix, Atorvastatin, Coreg    Hypertension  - BP stable. Continue Coreg, Entresto. Hyperlipidemia   - on Atorvastatin, Fenofibrate. CHF  - continue Entresto, Coreg. CKD stage 3  - stable. Monitor labs. Obesity  - Body mass index is 37.1 kg/m². - Recommended weight loss     DVT Prophylaxis: Lovenox  Diet: DIET CARB CONTROL; Carb Control: 4 carb choices (60 gms)/meal  Code Status: Full Code    CHANCE Whitley.

## 2021-05-18 NOTE — PROGRESS NOTES
Pulmonary Progress Note    CC: COVID-19    Subjective:   2 L O2  Still with cough        Intake/Output Summary (Last 24 hours) at 5/18/2021 0655  Last data filed at 5/18/2021 0004  Gross per 24 hour   Intake 1363 ml   Output --   Net 1363 ml       Exam:   /83   Pulse 76   Temp 97.1 °F (36.2 °C) (Oral)   Resp 17   Ht 5' 9\" (1.753 m)   Wt 251 lb 3.2 oz (113.9 kg)   SpO2 95%   BMI 37.10 kg/m²  on 2 L O2  Gen: No distress. Eyes: PERRL. No sclera icterus. No conjunctival injection. ENT: No discharge. Pharynx clear. Neck: Trachea midline. No obvious mass. Resp:  + Accessory muscle use. Few crackles. No wheezes. No rhonchi. No dullness on percussion. CV: Regular rate. Regular rhythm. No murmur or rub. No edema. GI: Non-tender. Non-distended. No hernia. Skin: Warm and dry. No nodule on exposed extremities. Lymph: No cervical LAD. No supraclavicular LAD. M/S: No cyanosis. No joint deformity. No clubbing. Neuro: Awake. Alert. Moves all four extremities. Psych: Oriented x 3.  No anxiety     Scheduled Meds:   remdesivir IVPB  100 mg Intravenous Nightly    aspirin  81 mg Oral Daily    atorvastatin  80 mg Oral Nightly    carvedilol  12.5 mg Oral BID WC    clopidogrel  75 mg Oral Daily    fenofibrate  160 mg Oral Daily    therapeutic multivitamin-minerals  1 tablet Oral Daily    sacubitril-valsartan  1 tablet Oral BID    sertraline  50 mg Oral Daily    sodium chloride flush  5-40 mL Intravenous 2 times per day    enoxaparin  30 mg Subcutaneous BID    insulin glargine  40 Units Subcutaneous Nightly    insulin lispro  0-18 Units Subcutaneous TID WC    insulin lispro  0-9 Units Subcutaneous Nightly    dexamethasone  6 mg Oral Daily    Vitamin D  2,000 Units Oral Daily     Continuous Infusions:   dextrose      sodium chloride       PRN Meds:  albuterol sulfate HFA **AND** ipratropium **AND** MDI Treatment, glucose, dextrose, glucagon (rDNA), dextrose, sodium chloride flush, sodium chloride, promethazine **OR** ondansetron, polyethylene glycol, acetaminophen **OR** acetaminophen, albuterol sulfate HFA, guaiFENesin-dextromethorphan    Labs:  CBC:   Recent Labs     05/16/21 1816 05/18/21  0601   WBC 8.3 11.6*   HGB 14.3 13.3*   HCT 42.2 40.6   MCV 88.0 89.1    311     BMP:   Recent Labs     05/16/21 1816 05/18/21  0601    139   K 5.0 5.0   CL 99 104   CO2 24 25   BUN 36* 38*   CREATININE 1.2 1.2     LIVER PROFILE:   Recent Labs     05/16/21 1816 05/18/21  0601   AST 31 28   ALT 38 40   BILIDIR  --  <0.2   BILITOT 0.4 0.3   ALKPHOS 65 54     PT/INR: No results for input(s): PROTIME, INR in the last 72 hours. APTT: No results for input(s): APTT in the last 72 hours. UA:  Recent Labs     05/16/21  1911   COLORU Yellow   PHUR 5.5   CLARITYU Clear   SPECGRAV <=1.005   LEUKOCYTESUR Negative   UROBILINOGEN 0.2   BILIRUBINUR Negative   BLOODU Negative   GLUCOSEU >=1000*     No results for input(s): PHART, SEG9VZZ, PO2ART in the last 72 hours. Cultures:   5/16 BC NGTD    Films:  Chest x-ray 5/16 imaging was reviewed by me and showed   Bilateral interstitial infiltrates     ASSESSMENT:  · Acute hypoxemic respiratory failure  · COVID-19 viral pneumonia  · DM with hyperglycemia  · Ischemic cardiomyopathy EF 35%  · CAD, ST elevation MI March 2020        PLAN:  · Supplemental oxygen to maintain SaO2 >92%; wean as tolerated  · Continuous pulse oximetry  · Droplet plus isolation (surgical mask, eye protection, gown, glove)  · Moved to negative pressure room in case needs aerosolized procedure  · Avoid NEBs as able-albuterol MDI strongly preferred   · Dexamethasone 6 mg day # 3  · Remdesivir day # 3. Monitor LFTs, renal and CBC daily while on medication.   · Will consider Tocilizumab if progressive hypoxemia  · Lovenox BID

## 2021-05-18 NOTE — PROGRESS NOTES
Patient's EF (Ejection Fraction) is less than 40%    Patient's weights and intake/output reviewed:    Patient's Last Weight: 113.9 kg obtained by bed scale. Difference of 0 lbs no than last documented weight. Intake/Output Summary (Last 24 hours) at 5/18/2021 1935  Last data filed at 5/18/2021 0004  Gross per 24 hour   Intake 283 ml   Output --   Net 283 ml         Pt is currently on RA. Pt denies shortness of breath. Pt without lower extremity edema. Patient and/or Family's stated Goal of Care this Admission: increase activity tolerance and be more comfortable prior to discharge      Comorbidities Reviewed Yes  Patient has a past medical history of Allergic rhinitis, Arrhythmia, CHF (congestive heart failure) ([address] CKD (chronic kidney disease) stage 3, GFR 30-59 ml/min, Coronary artery disease involving native coronary artery of native heart with angina pectoris ([address] Diverticulitis, Diverticulitis, Fatigue, GERD (gastroesophageal reflux disease), Hyperlipidemia, Hypertension, Microalbuminuria, Osteoarthritis, Plantar fasciitis, Testosterone deficiency, Type 2 diabetes, uncontrolled, with renal manifestation ([address] and Vitamin D deficiency.          >>For CHF and Comorbidity documentation on Education Time and Topics, please see Education Tab

## 2021-05-18 NOTE — FLOWSHEET NOTE
05/18/21 1530   Oxygen Therapy   SpO2 95 %   O2 Device None (Room air)   O2 Flow Rate (L/min) 0 L/min     Pt informed of his current oxygenation status.

## 2021-05-18 NOTE — PROGRESS NOTES
Received report from Halina Prabhakar, [facility]. Pt in stable condition. 0017.

## 2021-05-18 NOTE — FLOWSHEET NOTE
05/18/21 0714   Handoff   Communication Given Shift Handoff   Oncoming Nurse/Offgoing Nurse Livan/Kim   Handoff Communication Face to Face   Time Handoff Given 0703   End of Shift Check Performed Yes

## 2021-05-18 NOTE — FLOWSHEET NOTE
05/17/21 2118   Vital Signs   Temp 97.5 °F (36.4 °C)   Temp Source Oral   Pulse 80   Heart Rate Source Monitor   Resp 16   /72   BP Location Left upper arm   Patient Position Semi fowlers   Level of Consciousness Alert (0)   MEWS Score 1   Oxygen Therapy   SpO2 94 %   O2 Device Nasal cannula   O2 Flow Rate (L/min) 2 L/min   Assessment complete, see flowsheets. Pt has been educated to hospital falls prevention policy. They are aware they will be assessed every shift, and with any condition changes, by the nursing staff on their ability to perform their ADL's without need for assistance. Pt understands that based on the number of their score they are given a base score that will assign a level of low, medium, or high risk for falls. This patient has rated a high which requires a bed / chair alarm for their safety to prevent a fall. The patient is alert and oriented, and acknowledges and understands the need for intervention but refuses the application and use of the bed / chair alarm that is required per policy. Pt agrees to use call light and wait for help to arrive to assist them to get up when they need to. Call light is within reach and all other safety measures in place. PRN medication given per pt request within PRN order parameters. Will continue to monitor.   Uli Posada RN

## 2021-05-18 NOTE — FLOWSHEET NOTE
05/18/21 0758 05/18/21 0845   Vital Signs   Temp 96.8 °F (36 °C)  --    Temp Source Oral  --    Pulse 76  --    Heart Rate Source Monitor  --    Resp 18  --    BP (!) 144/78  --    BP Location Right upper arm  --    Patient Position Sitting  --    Oxygen Therapy   SpO2 97 % 96 %   O2 Device Nasal cannula Nasal cannula   O2 Flow Rate (L/min) 1.5 L/min 1.5 L/min  (1.5 to 1L)     Assessment complete. Meds passed. RN weaned pt to 1 L. Pt is at 96% at 0935 via continuous pulse oximeter. PT bowel sounds quiet, states his last BM was on  5/16. PT stated he had a slight headache. PRN tylenol given. RN gave education on diabetes and diabetic management. HOB up. Call light in reach. Will continue to monitor. Bedside Mobility Assessment Tool (BMAT):     Assessment Level 1- Sit and Shake    1. From a semi-reclined position, ask patient to sit up and rotate to a seated position at the side of the bed. Can use the bedrail. 2. Ask patient to reach out and grab your hand and shake making sure patient reaches across his/her midline. Pass- Patient is able to come to a seated position, maintain core strength. Maintains seated balance while reaching across midline. Move on to Assessment Level 2. Assessment Level 2- Stretch and Point   1. With patient in seated position at the side of the bed, have patient place both feet on the floor (or stool) with knees no higher than hips. 2. Ask patient to stretch one leg and straighten the knee, then bend the ankle/flex and point the toes. If appropriate, repeat with the other leg. Pass- Patient is able to demonstrate appropriate quad strength on intended weight bearing limb(s). Move onto Assessment Level 3. Assessment Level 3- Stand   1. Ask patient to elevate off the bed or chair (seated to standing) using an assistive device (cane, bedrail). 2. Patient should be able to raise buttocks off be and hold for a count of five. May repeat once.    Pass- Patient maintains

## 2021-05-18 NOTE — ED PROVIDER NOTES
I independently interviewed, examined and evaluated Nani Garcia. In brief, This is a 20-year-old male with a past medical history of CAD, diabetes, and hypertension presenting with shortness of breath. He is Covid positive diagnosed 5 days ago. He describes becoming progressively more short of breath at home, as well as hypoxic to mid 80s on his home pulse ox. He also describes a severe cough, with minimal exertion. He denies fever or chest pain on exam he is ill-appearing. He has a regular rate and rhythm. Lungs are diminished diffusely with inspiratory crackles in posterior lower lobes. No rhonchi. He is tachypneic with no accessory muscle use or conversational dyspnea. The Ekg interpreted by me shows  normal sinus rhythm with a rate of 96  Axis is   Left axis deviation  QTc is  within an acceptable range  Intervals and Durations are unremarkable. ST Segments: normal  Improved from prior EKG that showed STEMI on 3/14/20    ED course: Patient is a 20-year-old male presenting with shortness of breath in the setting of Covid infection. He was hypoxic on room air on arrival here at 89%. He normally does not wear oxygen at home. He is placed on 2 L and is in the mid 90s. He is afebrile. On exam he is ill-appearing, but acutely nontoxic. He is given duo nebs and Decadron. Chest x-ray is negative for lobar pneumonia, I do not suspect bacterial component and held off on antibiotics. Patient is given insulin, does appear dehydrated on lab work however has a history of CHF with questionable pulmonary edema so I kept him on the drier side and held off on fluids. He remained hemodynamically stable. Will require admission for hypoxia with Covid. I do not suspect PE. All diagnostic, treatment, and disposition decisions were made by myself in conjunction with the advanced practice provider.     For all further details of the patient's emergency department visit, please see the advanced practice provider's documentation. Comment: Please note this report has been produced using speech recognition software and may contain errors related to that system including errors in grammar, punctuation, and spelling, as well as words and phrases that may be inappropriate. If there are any questions or concerns please feel free to contact the dictating provider for clarification.          Ann Marie [place]  05/17/21 [id number]

## 2021-05-19 LAB
A/G RATIO: 1.1 (ref 1.1–2.2)
ALBUMIN SERPL-MCNC: 3.3 G/DL (ref 3.4–5)
ALP BLD-CCNC: 50 U/L (ref 40–129)
ALT SERPL-CCNC: 38 U/L (ref 10–40)
ANION GAP SERPL CALCULATED.3IONS-SCNC: 10 MMOL/L (ref 3–16)
AST SERPL-CCNC: 23 U/L (ref 15–37)
BILIRUB SERPL-MCNC: 0.3 MG/DL (ref 0–1)
BUN BLDV-MCNC: 34 MG/DL (ref 7–20)
CALCIUM SERPL-MCNC: 8.1 MG/DL (ref 8.3–10.6)
CHLORIDE BLD-SCNC: 105 MMOL/L (ref 99–110)
CO2: 23 MMOL/L (ref 21–32)
CREAT SERPL-MCNC: 1.1 MG/DL (ref 0.8–1.3)
GFR AFRICAN AMERICAN: >60
GFR NON-AFRICAN AMERICAN: >60
GLOBULIN: 2.9 G/DL
GLUCOSE BLD-MCNC: 107 MG/DL (ref 70–99)
GLUCOSE BLD-MCNC: 149 MG/DL (ref 70–99)
GLUCOSE BLD-MCNC: 193 MG/DL (ref 70–99)
GLUCOSE BLD-MCNC: 195 MG/DL (ref 70–99)
GLUCOSE BLD-MCNC: 305 MG/DL (ref 70–99)
GLUCOSE BLD-MCNC: 310 MG/DL (ref 70–99)
HCT VFR BLD CALC: 40 % (ref 40.5–52.5)
HEMOGLOBIN: 13.2 G/DL (ref 13.5–17.5)
MCH RBC QN AUTO: 29.3 PG (ref 26–34)
MCHC RBC AUTO-ENTMCNC: 33.1 G/DL (ref 31–36)
MCV RBC AUTO: 88.4 FL (ref 80–100)
PDW BLD-RTO: 15.6 % (ref 12.4–15.4)
PERFORMED ON: ABNORMAL
PLATELET # BLD: 336 K/UL (ref 135–450)
PMV BLD AUTO: 6.9 FL (ref 5–10.5)
POTASSIUM SERPL-SCNC: 4.5 MMOL/L (ref 3.5–5.1)
RBC # BLD: 4.52 M/UL (ref 4.2–5.9)
SODIUM BLD-SCNC: 138 MMOL/L (ref 136–145)
TOTAL PROTEIN: 6.2 G/DL (ref 6.4–8.2)
WBC # BLD: 10.9 K/UL (ref 4–11)

## 2021-05-19 PROCEDURE — 2700000000 HC OXYGEN THERAPY PER DAY

## 2021-05-19 PROCEDURE — 99232 SBSQ HOSP IP/OBS MODERATE 35: CPT | Performed by: INTERNAL MEDICINE

## 2021-05-19 PROCEDURE — 80053 COMPREHEN METABOLIC PANEL: CPT

## 2021-05-19 PROCEDURE — 2500000003 HC RX 250 WO HCPCS: Performed by: INTERNAL MEDICINE

## 2021-05-19 PROCEDURE — 36415 COLL VENOUS BLD VENIPUNCTURE: CPT

## 2021-05-19 PROCEDURE — 94761 N-INVAS EAR/PLS OXIMETRY MLT: CPT

## 2021-05-19 PROCEDURE — 85027 COMPLETE CBC AUTOMATED: CPT

## 2021-05-19 PROCEDURE — 6370000000 HC RX 637 (ALT 250 FOR IP): Performed by: INTERNAL MEDICINE

## 2021-05-19 PROCEDURE — 2580000003 HC RX 258: Performed by: INTERNAL MEDICINE

## 2021-05-19 PROCEDURE — 1200000000 HC SEMI PRIVATE

## 2021-05-19 PROCEDURE — 6360000002 HC RX W HCPCS: Performed by: INTERNAL MEDICINE

## 2021-05-19 PROCEDURE — 99233 SBSQ HOSP IP/OBS HIGH 50: CPT | Performed by: INTERNAL MEDICINE

## 2021-05-19 RX ADMIN — CARVEDILOL 12.5 MG: 6.25 TABLET, FILM COATED ORAL at 17:13

## 2021-05-19 RX ADMIN — Medication 1 TABLET: at 08:51

## 2021-05-19 RX ADMIN — SACUBITRIL AND VALSARTAN 1 TABLET: 49; 51 TABLET, FILM COATED ORAL at 08:51

## 2021-05-19 RX ADMIN — INSULIN GLARGINE 50 UNITS: 100 INJECTION, SOLUTION SUBCUTANEOUS at 22:11

## 2021-05-19 RX ADMIN — Medication 2000 UNITS: at 08:51

## 2021-05-19 RX ADMIN — ENOXAPARIN SODIUM 30 MG: 30 INJECTION SUBCUTANEOUS at 08:51

## 2021-05-19 RX ADMIN — CLOPIDOGREL BISULFATE 75 MG: 75 TABLET ORAL at 08:52

## 2021-05-19 RX ADMIN — ATORVASTATIN CALCIUM 80 MG: 40 TABLET, FILM COATED ORAL at 22:10

## 2021-05-19 RX ADMIN — REMDESIVIR 100 MG: 100 INJECTION, POWDER, LYOPHILIZED, FOR SOLUTION INTRAVENOUS at 22:11

## 2021-05-19 RX ADMIN — ASPIRIN 81 MG: 81 TABLET, CHEWABLE ORAL at 08:53

## 2021-05-19 RX ADMIN — SERTRALINE HYDROCHLORIDE 50 MG: 50 TABLET ORAL at 08:53

## 2021-05-19 RX ADMIN — SACUBITRIL AND VALSARTAN 1 TABLET: 49; 51 TABLET, FILM COATED ORAL at 22:09

## 2021-05-19 RX ADMIN — CARVEDILOL 12.5 MG: 6.25 TABLET, FILM COATED ORAL at 08:51

## 2021-05-19 RX ADMIN — ENOXAPARIN SODIUM 30 MG: 30 INJECTION SUBCUTANEOUS at 22:09

## 2021-05-19 RX ADMIN — INSULIN LISPRO 6 UNITS: 100 INJECTION, SOLUTION INTRAVENOUS; SUBCUTANEOUS at 22:11

## 2021-05-19 RX ADMIN — DEXAMETHASONE 6 MG: 4 TABLET ORAL at 08:52

## 2021-05-19 RX ADMIN — INSULIN GLARGINE 16 UNITS: 100 INJECTION, SOLUTION SUBCUTANEOUS at 08:50

## 2021-05-19 RX ADMIN — FENOFIBRATE 160 MG: 160 TABLET ORAL at 08:52

## 2021-05-19 NOTE — PLAN OF CARE
Problem: Falls - Risk of:  Goal: Will remain free from falls  Description: Will remain free from falls  Outcome: Ongoing  Goal: Absence of physical injury  Description: Absence of physical injury  Outcome: Ongoing     Problem: Airway Clearance - Ineffective  Goal: Achieve or maintain patent airway  Outcome: Ongoing     Problem: Gas Exchange - Impaired  Goal: Absence of hypoxia  Outcome: Ongoing  Goal: Promote optimal lung function  Outcome: Ongoing     Problem: Breathing Pattern - Ineffective  Goal: Ability to achieve and maintain a regular respiratory rate  Outcome: Ongoing     Problem:  Body Temperature -  Risk of, Imbalanced  Goal: Ability to maintain a body temperature within defined limits  Outcome: Ongoing  Goal: Will regain or maintain usual level of consciousness  Outcome: Ongoing  Goal: Complications related to the disease process, condition or treatment will be avoided or minimized  Outcome: Ongoing     Problem: Isolation Precautions - Risk of Spread of Infection  Goal: Prevent transmission of infection  Outcome: Ongoing     Problem: Nutrition Deficits  Goal: Optimize nutritional status  Outcome: Ongoing     Problem: Risk for Fluid Volume Deficit  Goal: Maintain normal heart rhythm  Outcome: Ongoing  Goal: Maintain absence of muscle cramping  Outcome: Ongoing  Goal: Maintain normal serum potassium, sodium, calcium, phosphorus, and pH  Outcome: Ongoing     Problem: Loneliness or Risk for Loneliness  Goal: Demonstrate positive use of time alone when socialization is not possible  Outcome: Ongoing     Problem: Fatigue  Goal: Verbalize increase energy and improved vitality  Outcome: Ongoing     Problem: Patient Education: Go to Patient Education Activity  Goal: Patient/Family Education  Outcome: Ongoing     Problem: Infection:  Goal: Will remain free from infection  Description: Will remain free from infection  Outcome: Ongoing     Problem: Safety:  Goal: Free from accidental physical injury  Description: Free from accidental physical injury  Outcome: Ongoing  Goal: Free from intentional harm  Description: Free from intentional harm  Outcome: Ongoing     Problem: Daily Care:  Goal: Daily care needs are met  Description: Daily care needs are met  Outcome: Ongoing     Problem: Pain:  Goal: Patient's pain/discomfort is manageable  Description: Patient's pain/discomfort is manageable  Outcome: Ongoing     Problem: Skin Integrity:  Goal: Skin integrity will stabilize  Description: Skin integrity will stabilize  Outcome: Ongoing     Problem: Discharge Planning:  Goal: Patients continuum of care needs are met  Description: Patients continuum of care needs are met  Outcome: Ongoing

## 2021-05-19 NOTE — PLAN OF CARE
Problem: Falls - Risk of:  Goal: Will remain free from falls  Description: Will remain free from falls  5/19/2021 1049 by Franko Alexander RN  Outcome: Ongoing  5/19/2021 0114 by Liana Leger RN  Outcome: Ongoing  Goal: Absence of physical injury  Description: Absence of physical injury  5/19/2021 1049 by Franko Alexander RN  Outcome: Ongoing  5/19/2021 0114 by Liana Leger RN  Outcome: Ongoing     Problem: Airway Clearance - Ineffective  Goal: Achieve or maintain patent airway  5/19/2021 1049 by Franko Alexander RN  Outcome: Ongoing  5/19/2021 0114 by Liana Leger RN  Outcome: Ongoing     Problem: Gas Exchange - Impaired  Goal: Absence of hypoxia  5/19/2021 1049 by Franko Alexander RN  Outcome: Ongoing  5/19/2021 0114 by Liana Leger RN  Outcome: Ongoing  Goal: Promote optimal lung function  5/19/2021 1049 by Franko Alexander RN  Outcome: Ongoing  5/19/2021 0114 by Liana Leger RN  Outcome: Ongoing     Problem: Breathing Pattern - Ineffective  Goal: Ability to achieve and maintain a regular respiratory rate  5/19/2021 1049 by Franko Alexander RN  Outcome: Ongoing  5/19/2021 0114 by Liana Leger RN  Outcome: Ongoing     Problem:  Body Temperature -  Risk of, Imbalanced  Goal: Ability to maintain a body temperature within defined limits  5/19/2021 1049 by Franko Alexander RN  Outcome: Ongoing  5/19/2021 0114 by Liana Leger RN  Outcome: Ongoing  Goal: Will regain or maintain usual level of consciousness  5/19/2021 1049 by Franko Alexander RN  Outcome: Ongoing  5/19/2021 0114 by Liana Leger RN  Outcome: Ongoing  Goal: Complications related to the disease process, condition or treatment will be avoided or minimized  5/19/2021 1049 by Franko Alexander RN  Outcome: Ongoing  5/19/2021 0114 by Liana Leger RN  Outcome: Ongoing     Problem: Isolation Precautions - Risk of Spread of Infection  Goal: Prevent transmission of infection  5/19/2021 1049 by Franko Alexander RN  Outcome: Ongoing  5/19/2021 0114 by Liana Leger RN  Outcome: Ongoing     Problem: Nutrition Deficits  Goal: Optimize nutritional status  5/19/2021 1049 by Yolanda Green RN  Outcome: Ongoing  5/19/2021 0114 by Jessica Moore RN  Outcome: Ongoing     Problem: Risk for Fluid Volume Deficit  Goal: Maintain normal heart rhythm  5/19/2021 1049 by Yolanda Green RN  Outcome: Ongoing  5/19/2021 0114 by Jessica Moore RN  Outcome: Ongoing  Goal: Maintain absence of muscle cramping  5/19/2021 1049 by Yolanda Green RN  Outcome: Ongoing  5/19/2021 0114 by Jessica Moore RN  Outcome: Ongoing  Goal: Maintain normal serum potassium, sodium, calcium, phosphorus, and pH  5/19/2021 1049 by Yolanda Green RN  Outcome: Ongoing  5/19/2021 0114 by Jessica Moore RN  Outcome: Ongoing     Problem: Loneliness or Risk for Loneliness  Goal: Demonstrate positive use of time alone when socialization is not possible  5/19/2021 1049 by Yolanda Green RN  Outcome: Ongoing  5/19/2021 0114 by Jessica Moore RN  Outcome: Ongoing     Problem: Fatigue  Goal: Verbalize increase energy and improved vitality  5/19/2021 1049 by Yolanda Green RN  Outcome: Ongoing  5/19/2021 0114 by Jessica Moore RN  Outcome: Ongoing     Problem: Patient Education: Go to Patient Education Activity  Goal: Patient/Family Education  5/19/2021 1049 by Yolanda Green RN  Outcome: Ongoing  5/19/2021 0114 by Jessica Moore RN  Outcome: Ongoing     Problem: Infection:  Goal: Will remain free from infection  Description: Will remain free from infection  5/19/2021 1049 by Yolanda Green RN  Outcome: Ongoing  5/19/2021 0114 by Jessica Moore RN  Outcome: Ongoing     Problem: Safety:  Goal: Free from accidental physical injury  Description: Free from accidental physical injury  5/19/2021 1049 by Yolanda Green RN  Outcome: Ongoing  5/19/2021 0114 by Jessica Moore RN  Outcome: Ongoing  Goal: Free from intentional harm  Description: Free from intentional harm  5/19/2021 1049 by Yolanda Green RN  Outcome: Ongoing  5/19/2021 0114 by Aramis Miller RN  Outcome: Ongoing     Problem: Daily Care:  Goal: Daily care needs are met  Description: Daily care needs are met  5/19/2021 1049 by Kait Reese RN  Outcome: Ongoing  5/19/2021 0114 by Aramis Miller RN  Outcome: Ongoing     Problem: Pain:  Goal: Patient's pain/discomfort is manageable  Description: Patient's pain/discomfort is manageable  5/19/2021 1049 by Kait Reese RN  Outcome: Ongoing  5/19/2021 0114 by Aramis Miller RN  Outcome: Ongoing     Problem: Skin Integrity:  Goal: Skin integrity will stabilize  Description: Skin integrity will stabilize  5/19/2021 1049 by Kait Reese RN  Outcome: Ongoing  5/19/2021 0114 by Aramis Miller RN  Outcome: Ongoing     Problem: Discharge Planning:  Goal: Patients continuum of care needs are met  Description: Patients continuum of care needs are met  5/19/2021 1049 by Kait Reese RN  Outcome: Ongoing  5/19/2021 0114 by Aramis Miller RN  Outcome: Ongoing     Problem: OXYGENATION/RESPIRATORY FUNCTION  Goal: Patient will maintain patent airway  Outcome: Ongoing  Goal: Patient will achieve/maintain normal respiratory rate/effort  Description: Respiratory rate and effort will be within normal limits for the patient  Outcome: Ongoing     Problem: HEMODYNAMIC STATUS  Goal: Patient has stable vital signs and fluid balance  Outcome: Ongoing     Problem: FLUID AND ELECTROLYTE IMBALANCE  Goal: Fluid and electrolyte balance are achieved/maintained  Outcome: Ongoing     Problem: ACTIVITY INTOLERANCE/IMPAIRED MOBILITY  Goal: Mobility/activity is maintained at optimum level for patient  Outcome: Ongoing

## 2021-05-19 NOTE — CARE COORDINATION
INTERDISCIPLINARY PLAN OF CARE CONFERENCE    Date/Time: 5/19/2021 12:05 PM  Completed by: Salome Russ RN, Case Management      Patient Name:  Anna John  YOB: 1960  Admitting Diagnosis: Acute hypoxemic respiratory failure due to COVID-19 [facility]) [U07.1, J96.01]     Admit Date/Time:  5/16/2021  5:29 PM    Chart reviewed. Interdisciplinary team contacted or reviewed plan related to patient progress and discharge plans. Disciplines included Case Management, Nursing, and Dietitian. Current Status: Inpt, labs, antibiotics, covid precautions  PT/OT recommendation for discharge plan of care:  Home with prn assist.    Expected D/C Disposition:  Home  Confirmed plan with patient and/or family Yes confirmed with: (name) with pt. Met with:with pt. Discharge Plan Comments: pt plans to return home at discharge. Following for poss hhc/dme needs.     Home O2 in place on admit: No  Pt informed of need to bring portable home O2 tank on day of discharge for nursing to connect prior to leaving:  Not Indicated  Verbalized agreement/Understanding:  Not Indicated

## 2021-05-19 NOTE — PROGRESS NOTES
glucagon (rDNA), dextrose, sodium chloride flush, sodium chloride, promethazine **OR** ondansetron, polyethylene glycol, acetaminophen **OR** acetaminophen, albuterol sulfate HFA, guaiFENesin-dextromethorphan    Labs:  CBC:   Recent Labs     05/16/21 1816 05/18/21  0601 05/19/21  0528   WBC 8.3 11.6* 10.9   HGB 14.3 13.3* 13.2*   HCT 42.2 40.6 40.0*   MCV 88.0 89.1 88.4    311 336     BMP:   Recent Labs     05/16/21 1816 05/18/21  0601 05/19/21  0528    139 138   K 5.0 5.0 4.5   CL 99 104 105   CO2 24 25 23   BUN 36* 38* 34*   CREATININE 1.2 1.2 1.1     LIVER PROFILE:   Recent Labs     05/16/21 1816 05/18/21  0601 05/19/21  0528   AST 31 28 23   ALT 38 40 38   BILIDIR  --  <0.2  --    BILITOT 0.4 0.3 0.3   ALKPHOS 65 54 50     PT/INR: No results for input(s): PROTIME, INR in the last 72 hours. APTT: No results for input(s): APTT in the last 72 hours. UA:  Recent Labs     05/16/21 1911   COLORU Yellow   PHUR 5.5   CLARITYU Clear   SPECGRAV <=1.005   LEUKOCYTESUR Negative   UROBILINOGEN 0.2   BILIRUBINUR Negative   BLOODU Negative   GLUCOSEU >=1000*     No results for input(s): PHART, LAU5ISO, PO2ART in the last 72 hours. Cultures:   5/16 BC NGTD    Films:  Chest x-ray 5/16 imaging was reviewed by me and showed   Bilateral interstitial infiltrates     ASSESSMENT:  · Acute hypoxemic respiratory failure  · COVID-19 viral pneumonia  · DM with hyperglycemia  · Ischemic cardiomyopathy EF 35%  · CAD, ST elevation MI March 2020        PLAN:  · Supplemental oxygen to maintain SaO2 >92%; wean as tolerated  · Continuous pulse oximetry  · Droplet plus isolation (surgical mask, eye protection, gown, glove)  · Moved to negative pressure room in case needs aerosolized procedure  · Avoid NEBs as able-albuterol MDI strongly preferred   · Dexamethasone 6 mg day # 4  · Remdesivir day # 4/5. Monitor LFTs, renal and CBC daily while on medication.   · Will consider Tocilizumab if progressive hypoxemia  · Lovenox BID · Discussed with internal medicine

## 2021-05-19 NOTE — PROGRESS NOTES
hours. CULTURES  Blood: pending  Sputum: pending   Results for Gagan Bravo (MRN [id number]) as of 5/17/2021 09:22   Ref. Range 5/11/2021 00:00   SARS-CoV-2 Unknown DETECTED (A)       RADIOLOGY  XR CHEST PORTABLE   Final Result   Minimal central pulmonary congestion. Hypoinflation with hazy bibasilar infiltrates and/or atelectasis which is   more prominent. Assessment/Plan:  Acute hypoxic respiratory failure  COVID pneumonia  - admitted to med-surg. Pulmonology consulted  - Decadron D# 4, Remdesivir D#4  - supplemental O2. Wean as tolerated  - on 2 L O2.   - Albuterol/Ipratropium inhalers prn.  - robitussin prn. Now on RA     Sepsis  - lactic acidosis, tachypnea  - due to above  - IVF's, trend lactic. Blood cx negative  - Procal 0.16. DM type 2, with hyperglycemia  - monitor glucose. HgbA1C pending.  - continue Lantus 40 units nightly, high dose SSI coverage. Increase lantus dose   Sugars better    CAD  - continue Aspirin, Plavix, Atorvastatin, Coreg    Hypertension  - BP stable. Continue Coreg, Entresto. Hyperlipidemia   - on Atorvastatin, Fenofibrate. CHF  - continue Entresto, Coreg. CKD stage 3  - stable. Monitor labs. Obesity  - Body mass index is 37.1 kg/m². - Recommended weight loss     DVT Prophylaxis: Lovenox  Diet: DIET CARB CONTROL; Carb Control: 4 carb choices (60 gms)/meal  Code Status: Full Code    CHANCE Whitley.

## 2021-05-19 NOTE — PROGRESS NOTES
Am assessment completed. See flowsheet. Pt denies needs at this time. Call light within reach. Radha Vasquez RN\

## 2021-05-20 VITALS
HEIGHT: 69 IN | HEART RATE: 86 BPM | SYSTOLIC BLOOD PRESSURE: 133 MMHG | WEIGHT: 251.2 LBS | RESPIRATION RATE: 18 BRPM | BODY MASS INDEX: 37.2 KG/M2 | TEMPERATURE: 97.5 F | OXYGEN SATURATION: 95 % | DIASTOLIC BLOOD PRESSURE: 76 MMHG

## 2021-05-20 LAB
A/G RATIO: 1 (ref 1.1–2.2)
ALBUMIN SERPL-MCNC: 3 G/DL (ref 3.4–5)
ALP BLD-CCNC: 49 U/L (ref 40–129)
ALT SERPL-CCNC: 53 U/L (ref 10–40)
ANION GAP SERPL CALCULATED.3IONS-SCNC: 8 MMOL/L (ref 3–16)
AST SERPL-CCNC: 31 U/L (ref 15–37)
BILIRUB SERPL-MCNC: 0.3 MG/DL (ref 0–1)
BLOOD CULTURE, ROUTINE: NORMAL
BUN BLDV-MCNC: 28 MG/DL (ref 7–20)
CALCIUM SERPL-MCNC: 8 MG/DL (ref 8.3–10.6)
CHLORIDE BLD-SCNC: 103 MMOL/L (ref 99–110)
CO2: 24 MMOL/L (ref 21–32)
CREAT SERPL-MCNC: 1.1 MG/DL (ref 0.8–1.3)
GFR AFRICAN AMERICAN: >60
GFR NON-AFRICAN AMERICAN: >60
GLOBULIN: 3.1 G/DL
GLUCOSE BLD-MCNC: 116 MG/DL (ref 70–99)
GLUCOSE BLD-MCNC: 152 MG/DL (ref 70–99)
GLUCOSE BLD-MCNC: 177 MG/DL (ref 70–99)
GLUCOSE BLD-MCNC: 388 MG/DL (ref 70–99)
GLUCOSE BLD-MCNC: 440 MG/DL (ref 70–99)
HCT VFR BLD CALC: 40 % (ref 40.5–52.5)
HEMOGLOBIN: 13.5 G/DL (ref 13.5–17.5)
MCH RBC QN AUTO: 29.8 PG (ref 26–34)
MCHC RBC AUTO-ENTMCNC: 33.8 G/DL (ref 31–36)
MCV RBC AUTO: 88.3 FL (ref 80–100)
PDW BLD-RTO: 15.5 % (ref 12.4–15.4)
PERFORMED ON: ABNORMAL
PLATELET # BLD: 343 K/UL (ref 135–450)
PMV BLD AUTO: 6.6 FL (ref 5–10.5)
POTASSIUM SERPL-SCNC: 4.4 MMOL/L (ref 3.5–5.1)
RBC # BLD: 4.53 M/UL (ref 4.2–5.9)
SODIUM BLD-SCNC: 135 MMOL/L (ref 136–145)
TOTAL PROTEIN: 6.1 G/DL (ref 6.4–8.2)
WBC # BLD: 9.8 K/UL (ref 4–11)

## 2021-05-20 PROCEDURE — 36415 COLL VENOUS BLD VENIPUNCTURE: CPT

## 2021-05-20 PROCEDURE — 99239 HOSP IP/OBS DSCHRG MGMT >30: CPT | Performed by: INTERNAL MEDICINE

## 2021-05-20 PROCEDURE — 6360000002 HC RX W HCPCS: Performed by: INTERNAL MEDICINE

## 2021-05-20 PROCEDURE — 99233 SBSQ HOSP IP/OBS HIGH 50: CPT | Performed by: INTERNAL MEDICINE

## 2021-05-20 PROCEDURE — 2580000003 HC RX 258: Performed by: INTERNAL MEDICINE

## 2021-05-20 PROCEDURE — 85027 COMPLETE CBC AUTOMATED: CPT

## 2021-05-20 PROCEDURE — 2500000003 HC RX 250 WO HCPCS: Performed by: INTERNAL MEDICINE

## 2021-05-20 PROCEDURE — 6370000000 HC RX 637 (ALT 250 FOR IP): Performed by: INTERNAL MEDICINE

## 2021-05-20 PROCEDURE — 80053 COMPREHEN METABOLIC PANEL: CPT

## 2021-05-20 RX ORDER — CHOLECALCIFEROL (VITAMIN D3) 50 MCG
2000 TABLET ORAL DAILY
Qty: 15 TABLET | Refills: 0 | Status: SHIPPED | OUTPATIENT
Start: 2021-05-20 | End: 2021-11-19

## 2021-05-20 RX ORDER — DEXAMETHASONE 6 MG/1
6 TABLET ORAL DAILY
Qty: 6 TABLET | Refills: 0 | Status: SHIPPED | OUTPATIENT
Start: 2021-05-20 | End: 2021-05-26

## 2021-05-20 RX ADMIN — SODIUM CHLORIDE 25 ML: 900 INJECTION INTRAVENOUS at 16:13

## 2021-05-20 RX ADMIN — CARVEDILOL 12.5 MG: 6.25 TABLET, FILM COATED ORAL at 16:14

## 2021-05-20 RX ADMIN — Medication 2000 UNITS: at 09:20

## 2021-05-20 RX ADMIN — DEXAMETHASONE 6 MG: 4 TABLET ORAL at 09:20

## 2021-05-20 RX ADMIN — ASPIRIN 81 MG: 81 TABLET, CHEWABLE ORAL at 09:21

## 2021-05-20 RX ADMIN — REMDESIVIR 100 MG: 100 INJECTION, POWDER, LYOPHILIZED, FOR SOLUTION INTRAVENOUS at 16:13

## 2021-05-20 RX ADMIN — SERTRALINE HYDROCHLORIDE 50 MG: 50 TABLET ORAL at 09:20

## 2021-05-20 RX ADMIN — INSULIN GLARGINE 16 UNITS: 100 INJECTION, SOLUTION SUBCUTANEOUS at 09:19

## 2021-05-20 RX ADMIN — CARVEDILOL 12.5 MG: 6.25 TABLET, FILM COATED ORAL at 09:20

## 2021-05-20 RX ADMIN — CLOPIDOGREL BISULFATE 75 MG: 75 TABLET ORAL at 09:20

## 2021-05-20 RX ADMIN — SACUBITRIL AND VALSARTAN 1 TABLET: 49; 51 TABLET, FILM COATED ORAL at 09:20

## 2021-05-20 RX ADMIN — Medication 1 TABLET: at 09:20

## 2021-05-20 RX ADMIN — FENOFIBRATE 160 MG: 160 TABLET ORAL at 09:20

## 2021-05-20 RX ADMIN — SODIUM CHLORIDE, PRESERVATIVE FREE 10 ML: 5 INJECTION INTRAVENOUS at 00:18

## 2021-05-20 RX ADMIN — ENOXAPARIN SODIUM 30 MG: 30 INJECTION SUBCUTANEOUS at 09:19

## 2021-05-20 NOTE — DISCHARGE SUMMARY
Name:  Naeem Terry  Room:  0205/0205-02  MRN:    [id number]    Discharge Summary      This discharge summary is in conjunction with a complete physical exam done on the day of discharge. Attending Physician: Dr. Kori Pérez  Discharging Physician: Dr. Teague [place]: 2021  Discharge:   2021    HPI:  The patient is a 61 y.o. male with PMH below, presents with SOB/ROQUE, hypoxia, myalgias, dry cough, diarrhea. Pt reports that he was Dx w/ COVID on the . He reports increasing SOB/ROQUE since, especially worse today so he came to the ER. He reports he has had associated diarrhea. He reports that his PCP put him on prednisone 4 days ago but it has not helped much. His pOx was in the 80's at home. He is requiring 2L O2 and he is not normally on O2. He was reportedly recently Dx w/ CHF and started on Lasix. He has had a few episodes of diarrhea. He has had associated myalgias, fatigue, dry cough. Diagnoses this Admission and Hospital Course   Acute hypoxic respiratory failure  COVID pneumonia  - admitted to med-surg. Pulmonology consulted  - Decadron D# 5, Remdesivir D#5  - supplemental O2. Wean as tolerated  - on 2 L O2.   - Albuterol/Ipratropium inhalers prn.  - robitussin prn. Now on RA      Sepsis  - lactic acidosis, tachypnea  - due to above  - IVF's, trend lactic. Blood cx negative  - Procal 0. 16.     DM type 2, with hyperglycemia  - monitored glucose. HgbA1C 7.9  - continued Lantus 40 units nightly, high dose SSI coverage. Increased lantus dose   Sugars better     CAD  - continued Aspirin, Plavix, Atorvastatin, Coreg     Hypertension  - BP stable. Continued Coreg, Entresto.     Hyperlipidemia   - on Atorvastatin, Fenofibrate.      CHF  - continued Entresto, Coreg.      CKD stage 3  - stable. Monitored labs.     Obesity  - Body mass index is 37.1 kg/m².   - Recommended weight loss      DVT Prophylaxis: Lovenox    Procedures (Please Review Full Report for Details)  N/A    Consults    Pulmonology       Physical Exam at Discharge:    /68   Pulse 76   Temp 98 °F (36.7 °C) (Oral)   Resp 18   Ht 5' 9\" (1.753 m)   Wt 251 lb 3.2 oz (113.9 kg)   SpO2 95%   BMI 37.10 kg/m²     See today's progress note    CBC:   Recent Labs     05/18/21  0601 05/19/21  0528 05/20/21  0553   WBC 11.6* 10.9 9.8   HGB 13.3* 13.2* 13.5   HCT 40.6 40.0* 40.0*   MCV 89.1 88.4 88.3    336 343     BMP:   Recent Labs     05/18/21  0601 05/19/21  0528 05/20/21  0553    138 135*   K 5.0 4.5 4.4    105 103   CO2 25 23 24   BUN 38* 34* 28*   CREATININE 1.2 1.1 1.1     LIVER PROFILE:   Recent Labs     05/18/21  0601 05/19/21  0528 05/20/21  0553   AST 28 23 31   ALT 40 38 53*   BILIDIR <0.2  --   --    BILITOT 0.3 0.3 0.3   ALKPHOS 54 50 49       U/A:    Lab Results   Component Value Date    COLORU Yellow 05/16/2021    CLARITYU Clear 05/16/2021    SPECGRAV <=1.005 05/16/2021    LEUKOCYTESUR Negative 05/16/2021    BLOODU Negative 05/16/2021    GLUCOSEU >=1000 05/16/2021       CULTURES  Blood: pending  Sputum: pending   Results for Reyna Barry (MRN [id number]) as of 5/17/2021 09:22    Ref. Range 5/11/2021 00:00   SARS-CoV-2 Unknown DETECTED (A)         RADIOLOGY  XR CHEST PORTABLE   Final Result   Minimal central pulmonary congestion. Hypoinflation with hazy bibasilar infiltrates and/or atelectasis which is   more prominent.                Discharge Medications     Medication List      START taking these medications    dexamethasone 6 MG tablet  Commonly known as: DECADRON  Take 1 tablet by mouth daily for 6 days     vitamin D 50 MCG (2000 UT) Tabs tablet  Commonly known as: CHOLECALCIFEROL  Take 1 tablet by mouth daily for 15 days        CHANGE how you take these medications    atorvastatin 80 MG tablet  Commonly known as: LIPITOR  Take 1 tablet by mouth nightly  What changed: when to take this        CONTINUE taking these medications    aspirin 81 MG tablet

## 2021-05-20 NOTE — PROGRESS NOTES
Progress Note    Admit Date:  5/16/2021    61year old male with DM type 2, OA, hypertension, hyperlipidemia, GERD, CAD, CKD, and CHF presented to [facility] with c/o shortness of breath, hypoxia, myalgias, cough, and diarrhea. Tested positive for COVID on 5/11. Admitted to [address] [place] [address] for Jose. Subjective:  Mr. Tran Montoya is now on RA     Objective:   Patient Vitals for the past 4 hrs:   BP Temp Temp src Pulse Resp SpO2   05/20/21 0803 113/68 98 °F (36.7 °C) Oral 76 18 95 %            Intake/Output Summary (Last 24 hours) at 5/20/2021 5816  Last data filed at 5/19/2021 1756  Gross per 24 hour   Intake 600 ml   Output --   Net 600 ml       Physical Exam:    Gen: No distress. Alert. Eyes: PERRL. No sclera icterus. No conjunctival injection. ENT: No discharge. Pharynx clear. Neck: No JVD. No Carotid Bruit. Trachea midline. Resp: No accessory muscle use. bibasilar crackles. No wheezes. No rhonchi. CV: Regular rate. Regular rhythm. No murmur. No rub. No edema. Capillary Refill: Brisk,< 3 seconds   Peripheral Pulses: +2 palpable, equal bilaterally   GI: Non-tender. Non-distended. No masses. No organomegaly. Normal bowel sounds. No hernia. Skin: Warm and dry. No nodule on exposed extremities. No rash on exposed extremities. M/S: No cyanosis. No joint deformity. No clubbing. Neuro: Awake. Grossly nonfocal    Psych: Oriented x 3.  No anxiety or agitation      Data:  CBC:   Recent Labs     05/18/21  0601 05/19/21  0528 05/20/21  0553   WBC 11.6* 10.9 9.8   HGB 13.3* 13.2* 13.5   HCT 40.6 40.0* 40.0*   MCV 89.1 88.4 88.3    336 343     BMP:   Recent Labs     05/18/21  0601 05/19/21  0528 05/20/21  0553    138 135*   K 5.0 4.5 4.4    105 103   CO2 25 23 24   BUN 38* 34* 28*   CREATININE 1.2 1.1 1.1     LIVER PROFILE:   Recent Labs     05/18/21  0601 05/19/21  0528 05/20/21  0553   AST 28 23 31   ALT 40 38 53*   BILIDIR <0.2  --   --    BILITOT 0.3 0.3 0.3   ALKPHOS 54 50 49     PT/INR: No results for input(s): PROTIME, INR in the last 72 hours. CULTURES  Blood: pending  Sputum: pending   Results for Alessandra Segal (MRN [id number]) as of 5/17/2021 09:22   Ref. Range 5/11/2021 00:00   SARS-CoV-2 Unknown DETECTED (A)       RADIOLOGY  XR CHEST PORTABLE   Final Result   Minimal central pulmonary congestion. Hypoinflation with hazy bibasilar infiltrates and/or atelectasis which is   more prominent. Assessment/Plan:  Acute hypoxic respiratory failure  COVID pneumonia  - admitted to med-surg. Pulmonology consulted  - Decadron D# 5, Remdesivir D#5  - supplemental O2. Wean as tolerated  - on 2 L O2.   - Albuterol/Ipratropium inhalers prn.  - robitussin prn. Now on RA     Sepsis  - lactic acidosis, tachypnea  - due to above  - IVF's, trend lactic. Blood cx negative  - Procal 0.16. DM type 2, with hyperglycemia  - monitor glucose. HgbA1C pending.  - continue Lantus 40 units nightly, high dose SSI coverage. Increase lantus dose   Sugars better    CAD  - continue Aspirin, Plavix, Atorvastatin, Coreg    Hypertension  - BP stable. Continue Coreg, Entresto. Hyperlipidemia   - on Atorvastatin, Fenofibrate. CHF  - continue Entresto, Coreg. CKD stage 3  - stable. Monitor labs. Obesity  - Body mass index is 37.1 kg/m². - Recommended weight loss     DVT Prophylaxis: Lovenox  Diet: DIET CARB CONTROL; Carb Control: 4 carb choices (60 gms)/meal  Code Status: Full Code    CHANCE Whitley.

## 2021-05-20 NOTE — PROGRESS NOTES
Pulmonary Progress Note    CC: COVID-19    Subjective:   Appears comfortable  Room air        Intake/Output Summary (Last 24 hours) at 5/20/2021 0709  Last data filed at 5/19/2021 1756  Gross per 24 hour   Intake 600 ml   Output --   Net 600 ml       Exam:   /81   Pulse 77   Temp 96.9 °F (36.1 °C) (Oral)   Resp 18   Ht 5' 9\" (1.753 m)   Wt 251 lb 3.2 oz (113.9 kg)   SpO2 95%   BMI 37.10 kg/m²  on room air  Gen: No distress. Eyes: PERRL. No sclera icterus. No conjunctival injection. ENT: No discharge. Pharynx clear. Neck: Trachea midline. No obvious mass. Resp:  No accessory muscle use. Few crackles. No wheezes. No rhonchi. No dullness on percussion. CV: Regular rate. Regular rhythm. No murmur or rub. No edema. GI: Non-tender. Non-distended. No hernia. Skin: Warm and dry. No nodule on exposed extremities. Lymph: No cervical LAD. No supraclavicular LAD. M/S: No cyanosis. No joint deformity. No clubbing. Neuro: Awake. Alert. Moves all four extremities. Psych: Oriented x 3.  No anxiety     Scheduled Meds:   insulin glargine  50 Units Subcutaneous Nightly    insulin glargine  16 Units Subcutaneous QAM    remdesivir IVPB  100 mg Intravenous Nightly    aspirin  81 mg Oral Daily    atorvastatin  80 mg Oral Nightly    carvedilol  12.5 mg Oral BID WC    clopidogrel  75 mg Oral Daily    fenofibrate  160 mg Oral Daily    therapeutic multivitamin-minerals  1 tablet Oral Daily    sacubitril-valsartan  1 tablet Oral BID    sertraline  50 mg Oral Daily    sodium chloride flush  5-40 mL Intravenous 2 times per day    enoxaparin  30 mg Subcutaneous BID    insulin lispro  0-18 Units Subcutaneous TID WC    insulin lispro  0-9 Units Subcutaneous Nightly    dexamethasone  6 mg Oral Daily    Vitamin D  2,000 Units Oral Daily     Continuous Infusions:   dextrose      sodium chloride       PRN Meds:  albuterol sulfate HFA **AND** ipratropium **AND** MDI Treatment, glucose, dextrose, glucagon (rDNA), dextrose, sodium chloride flush, sodium chloride, promethazine **OR** ondansetron, polyethylene glycol, acetaminophen **OR** acetaminophen, albuterol sulfate HFA, guaiFENesin-dextromethorphan    Labs:  CBC:   Recent Labs     05/18/21  0601 05/19/21  0528 05/20/21  0553   WBC 11.6* 10.9 9.8   HGB 13.3* 13.2* 13.5   HCT 40.6 40.0* 40.0*   MCV 89.1 88.4 88.3    336 343     BMP:   Recent Labs     05/18/21  0601 05/19/21  0528 05/20/21  0553    138 135*   K 5.0 4.5 4.4    105 103   CO2 25 23 24   BUN 38* 34* 28*   CREATININE 1.2 1.1 1.1     LIVER PROFILE:   Recent Labs     05/18/21  0601 05/19/21  0528 05/20/21  0553   AST 28 23 31   ALT 40 38 53*   BILIDIR <0.2  --   --    BILITOT 0.3 0.3 0.3   ALKPHOS 54 50 49     PT/INR: No results for input(s): PROTIME, INR in the last 72 hours. APTT: No results for input(s): APTT in the last 72 hours. UA:  No results for input(s): NITRITE, COLORU, PHUR, LABCAST, WBCUA, RBCUA, MUCUS, TRICHOMONAS, YEAST, BACTERIA, CLARITYU, SPECGRAV, LEUKOCYTESUR, UROBILINOGEN, BILIRUBINUR, BLOODU, GLUCOSEU, AMORPHOUS in the last 72 hours. Invalid input(s): KETONESU  No results for input(s): PHART, HTB3MFQ, PO2ART in the last 72 hours. Cultures:   5/16 BC NGTD    Films:  Chest x-ray 5/16 imaging was reviewed by me and showed   Bilateral interstitial infiltrates     ASSESSMENT:  · Acute hypoxemic respiratory failure  · COVID-19 viral pneumonia  · DM with hyperglycemia  · Ischemic cardiomyopathy EF 35%  · CAD, ST elevation MI March 2020        PLAN:  · Supplemental oxygen to maintain SaO2 >92%; wean as tolerated  · Continuous pulse oximetry  · Droplet plus isolation (surgical mask, eye protection, gown, glove)  · Moved to negative pressure room in case needs aerosolized procedure  · Avoid NEBs as able-albuterol MDI strongly preferred   · Dexamethasone 6 mg day # 5/10  · Remdesivir day # 5/5. Monitor LFTs, renal and CBC daily while on medication.   · Lovenox BID   · I recommend CXR in 4-6 week to document resolution of abnormalities and follow up with PCP  · DC planning is okay with pulmonary  · Discussed with internal medicine

## 2021-05-20 NOTE — PLAN OF CARE
Problem: Falls - Risk of:  Goal: Will remain free from falls  Description: Will remain free from falls  Outcome: Completed  Goal: Absence of physical injury  Description: Absence of physical injury  Outcome: Completed     Problem: Airway Clearance - Ineffective  Goal: Achieve or maintain patent airway  Outcome: Completed     Problem: Gas Exchange - Impaired  Goal: Absence of hypoxia  Outcome: Completed  Goal: Promote optimal lung function  Outcome: Completed     Problem: Breathing Pattern - Ineffective  Goal: Ability to achieve and maintain a regular respiratory rate  Outcome: Completed     Problem:  Body Temperature -  Risk of, Imbalanced  Goal: Ability to maintain a body temperature within defined limits  Outcome: Completed  Goal: Will regain or maintain usual level of consciousness  Outcome: Completed  Goal: Complications related to the disease process, condition or treatment will be avoided or minimized  Outcome: Completed     Problem: Isolation Precautions - Risk of Spread of Infection  Goal: Prevent transmission of infection  Outcome: Completed     Problem: Nutrition Deficits  Goal: Optimize nutritional status  Outcome: Completed     Problem: Risk for Fluid Volume Deficit  Goal: Maintain normal heart rhythm  Outcome: Completed  Goal: Maintain absence of muscle cramping  Outcome: Completed  Goal: Maintain normal serum potassium, sodium, calcium, phosphorus, and pH  Outcome: Completed     Problem: Loneliness or Risk for Loneliness  Goal: Demonstrate positive use of time alone when socialization is not possible  Outcome: Completed     Problem: Fatigue  Goal: Verbalize increase energy and improved vitality  Outcome: Completed     Problem: Patient Education: Go to Patient Education Activity  Goal: Patient/Family Education  Outcome: Completed     Problem: Infection:  Goal: Will remain free from infection  Description: Will remain free from infection  Outcome: Completed     Problem: Safety:  Goal: Free from accidental physical injury  Description: Free from accidental physical injury  Outcome: Completed  Goal: Free from intentional harm  Description: Free from intentional harm  Outcome: Completed     Problem: Daily Care:  Goal: Daily care needs are met  Description: Daily care needs are met  Outcome: Completed     Problem: Pain:  Goal: Patient's pain/discomfort is manageable  Description: Patient's pain/discomfort is manageable  Outcome: Completed     Problem: Skin Integrity:  Goal: Skin integrity will stabilize  Description: Skin integrity will stabilize  Outcome: Completed     Problem: Discharge Planning:  Goal: Patients continuum of care needs are met  Description: Patients continuum of care needs are met  Outcome: Completed     Problem: OXYGENATION/RESPIRATORY FUNCTION  Goal: Patient will maintain patent airway  Outcome: Completed  Goal: Patient will achieve/maintain normal respiratory rate/effort  Description: Respiratory rate and effort will be within normal limits for the patient  Outcome: Completed     Problem: HEMODYNAMIC STATUS  Goal: Patient has stable vital signs and fluid balance  Outcome: Completed     Problem: FLUID AND ELECTROLYTE IMBALANCE  Goal: Fluid and electrolyte balance are achieved/maintained  Outcome: Completed     Problem: ACTIVITY INTOLERANCE/IMPAIRED MOBILITY  Goal: Mobility/activity is maintained at optimum level for patient  Outcome: Completed

## 2021-05-20 NOTE — PROGRESS NOTES
Prescriptions provided thru meds-to-beds, discharge instructions given, pt verbalized understanding, denies questions/ needs at this time. Pt waiting on family arrival for discharge home. Dima Rahman RN\

## 2021-05-20 NOTE — CARE COORDINATION
DISCHARGE ORDER  Date/Time 2021 9:48 AM  Completed by: Rikki De La Torre, RN, Case Management    Patient Name: Thanh Almaraz      : 1960  Admitting Diagnosis: Acute hypoxemic respiratory failure due to COVID-19 ([facility] [address] [U07.1, J96.01]      Admit order Date and Status:2021  (verify MD's last order for status of admission)      Noted discharge order. If applicable PT/OT recommendation at Discharge: Home PRN assist   DME recommendation by PT/OT:Needs Met  Confirmed discharge plan: Yes  with whom_pt via telephone______________  If pt confirmed DC plan does family need to be contacted by CM No if yes who______  Discharge Plan: Chart reviewed. Spoke with pt via telephone due to COVID precautions and pt staets he is returning home with spouse who will provide PRN assistance. Pt declines HHC at this time and per bedside RN has been ambulating in room on RA and has no dropped below 95%. No further dc needs voiced or identified. Reviewed chart. Role of discharge planner explained and patient verbalized understanding. Discharge order is noted. Has Home O2 in place on admit:  No    Pt is being d/c'd to home today. Pt's O2 sats are 95% on RA. Discharge timeout done with Regla. All discharge needs and concerns addressed.

## 2021-05-20 NOTE — PROGRESS NOTES
Pt's family arrived, pt transported via wheelchair to front lobby to meet family for discharge home. Vandana Upton RN\

## 2021-05-21 ENCOUNTER — CARE COORDINATION (OUTPATIENT)
Dept: CASE MANAGEMENT | Age: 61
End: 2021-05-21

## 2021-05-21 NOTE — CARE COORDINATION
[facility] Transitions Initial Follow Up Call    Call within 2 business days of discharge: Yes    Patient: Veena Bhagat Patient : 1960   MRN: [id number]  Reason for Admission: covid  Discharge Date: 21 RARS: Readmission Risk Score: 18      Last Discharge [address]       Complaint Diagnosis Description Type Department Provider    21 Positive For Covid-19 Acute hypoxemic respiratory failure due to COVID-19 [facility]) . .. ED to Hosp-Admission (Discharged) (ADMITTED) [address] Lakia Muniz MD; Mo. .. [facility] with[facility]      Transitions of Care Initial Call    Was this an external facility discharge? No Discharge Facility: na    Challenges to be reviewed by the provider   Additional needs identified to be addressed with provider No  none             Method of communication with provider : phone      Advance Care Planning:   Does patient have an Advance Directive:  reviewed and needs to be updated. Was this a readmission? No  Patient stated reason for admission: covid  Patients top risk factors for readmission: medical condition-covid    Care Transition Nurse (CTN) contacted the patient by telephone to perform post hospital discharge assessment. Verified name and  with patient as identifiers. Provided introduction to self, and explanation of the CTN role. CTN reviewed discharge instructions, medical action plan and red flags with patient who verbalized understanding. Patient given an opportunity to ask questions and does not have any further questions or concerns at this time. Were discharge instructions available to patient? Yes. Reviewed appropriate site of care based on symptoms and resources available to patient including: PCP. The patient agrees to contact the PCP office for questions related to their healthcare.      Medication reconciliation was performed with patient, who verbalizes understanding of administration of home medications. Advised obtaining a 90-day supply of all daily and as-needed medications. Covid Risk Education     Educated patient about risk for severe COVID-19 due to risk factors according to CDC guidelines. CTN reviewed discharge instructions, medical action plan and red flag symptoms patient who verbalized understanding. Discussed COVID vaccination status Yes. Education provided on COVID-19 vaccination as appropriate. Discussed exposure protocols and quarantine with CDC Guidelines. Patient was given an opportunity to verbalize any questions and concerns and agrees to contact CTN or health care provider for questions related to their healthcare. Reviewed and educated patient on any new and changed medications related to discharge diagnosis     Patient answered call and verified . Patient received several phone calls this morning, but agreeable to transition call. Patient discussed recent hospitalization and happy to be back home. Patient aware of quarantine and isolation guidelines, but ready to get back home. Patient has been checking O2 sat and running 94-95% on RA. Occasional cough and fatigue are biggest symptoms at this time. Patient is planning on returning to work Monday. CTN discussed quarantine timeframe and discussing time off work with PCP if needed. Patient stated understanding. Patient has new prescriptions and taking as directed. Full medication reconciliation declined per patient. ACP discussed and emergency contacts confirmed in system, but no HIPAA form on file. Denies any acute needs at present time. Agreeable to f/u calls. Educated on the use of urgent care or physicians 24 hr access line if assistance is needed after hours. CTN provided contact information. Plan for follow-up call in 5-7 days based on severity of symptoms and risk factors.   Samuel Benitez RN   Care Transition Nurse  [phone]    Care Transitions 24 Hour Call    Do you have any ongoing symptoms?: No  Do you have a copy of your discharge instructions?: Yes  Do you have all of your prescriptions and are they filled?: Yes  Have you been contacted by a [facility]?: No  Have you scheduled your follow up appointment?: Yes  How are you going to get to your appointment?: Car - family or friend to transport  Were you discharged with any Home Care or Post Acute Services: No  Do you feel like you have everything you need to keep you well at home?: Yes  Care Transitions Interventions         Follow Up  Future Appointments   Date Time Provider Jose Millan   10/20/2021  3:00 PM MONA Pedroza - [place] RN

## 2021-05-27 ENCOUNTER — CARE COORDINATION (OUTPATIENT)
Dept: CASE MANAGEMENT | Age: 61
End: 2021-05-27

## 2021-06-08 ENCOUNTER — CARE COORDINATION (OUTPATIENT)
Dept: CASE MANAGEMENT | Age: 61
End: 2021-06-08

## 2021-06-16 ENCOUNTER — CARE COORDINATION (OUTPATIENT)
Dept: CASE MANAGEMENT | Age: 61
End: 2021-06-16

## 2021-06-16 NOTE — CARE COORDINATION
Patient contacted regarding COVID-19 diagnosis.      COVID-19 Detected on 5/11/2021     2nd attempt - Unable to reach patient by phone at this time. Message left requesting return call. No further CTN outreach scheduled.  Episode resolved at this time.      Saji Quick, RN  Care Transition Nurse  [phone] mobile    Future Appointments   Date Time Provider Jose Millan   10/20/2021  3:00 PM MONA Boyer - NAY [place]

## 2021-11-18 NOTE — PROGRESS NOTES
[address]   Cardiac Follow-up    Primary Care Doctor:  Natividad Reece MD    Chief Complaint   Patient presents with    6 Month Follow-Up        History of Present Illness:   I had the pleasure of seeing Tera Mckee in follow up for ischemic cardiomyopathy. Admitted 3/14/20-3/16/20 with STEMI. S/p DEONTE to LAD x2. Echocardiogram shows LVEF down to 35%. Since last visit, he was started on Entresto. Brilinta was stopped and started on plavix instead due to shortness of breath. Repeat echocardiogram April 2021 showed LVEF 35 to 40%. Back to walking again, feels better after walking. Breathing has been stable. Will hold of taking the lasix when he has to travel. He continues on 20 mg daily most days. He is very thirsty with the diabetes. He tries to cut back on the fluids. Having sciatica pains. Having minimal edema. Staying active. He does complain of fatigue. Is not been tested for sleep apnea. This occasional twinge of chest pain, not lasting very long. Gets occasional dizziness with position changes    Tera Mckee describes symptoms including fatigue but denies chest pain. Past Medical History:   has a past medical history of Allergic rhinitis, Arrhythmia, CHF (congestive heart failure) ([address] CKD (chronic kidney disease) stage 3, GFR 30-59 ml/min ([facility]), Coronary artery disease involving native coronary artery of native heart with angina pectoris ([address] Diverticulitis, Diverticulitis, Fatigue, GERD (gastroesophageal reflux disease), Hyperlipidemia, Hypertension, Microalbuminuria, Osteoarthritis, Plantar fasciitis, Testosterone deficiency, Type 2 diabetes, uncontrolled, with renal manifestation ([address] and Vitamin D deficiency. Surgical History:   has a past surgical history that includes eye surgery; Appendectomy; and shoulder surgery (2006). Social History:   reports that he has quit smoking.  He has never used smokeless tobacco. He reports that he does not drink alcohol and does not use drugs. Family History:   Family History   Problem Relation Age of Onset    Glaucoma Mother     Heart Disease Father     Heart Attack Father     Cancer Maternal Grandmother     Cancer Maternal Grandfather        Home Medications:  Prior to Admission medications    Medication Sig Start Date End Date Taking?  Authorizing Provider   Vitamin D (CHOLECALCIFEROL) 50 MCG (2000 UT) TABS tablet Take 1 tablet by mouth daily for 15 days 5/20/21 11/19/21 Yes Jacinda Garcia MD   Dulaglutide (TRULICITY SC) Inject 1.5 pens into the skin once a week   Yes Historical Provider, MD   sertraline (ZOLOFT) 50 MG tablet Take 50 mg by mouth daily   Yes Historical Provider, MD   atorvastatin (LIPITOR) 80 MG tablet Take 1 tablet by mouth nightly  Patient taking differently: Take 80 mg by mouth daily  4/20/21  Yes MONA Rivera CNP   furosemide (LASIX) 20 MG tablet Take 1 tablet by mouth daily 4/20/21  Yes MONA Rivera CNP   clopidogrel (PLAVIX) 75 MG tablet Take 1 tablet by mouth daily 4/14/21  Yes Joceline Smalls MD   carvedilol (COREG) 12.5 MG tablet TAKE 1 TABLET BY MOUTH  TWICE DAILY 4/8/21  Yes MONA Rivera CNP   sacubitril-valsartan (ENTRESTO) 49-51 MG per tablet Take 1 tablet by mouth 2 times daily 4/8/21  Yes MONA Rivera CNP   empagliflozin (JARDIANCE) 25 MG tablet Take 25 mg by mouth daily   Yes Historical Provider, MD   Methylcellulose, Laxative, (CITRUCEL PO) Take by mouth   Yes Historical Provider, MD   Multiple Vitamins-Minerals (THERAPEUTIC MULTIVITAMIN-MINERALS) tablet Take 1 tablet by mouth daily   Yes Historical Provider, MD   TURMERIC CURCUMIN PO Take by mouth   Yes Historical Provider, MD   insulin lispro protamine & lispro (HUMALOG MIX) (75-25) 100 UNIT per ML SUSP injection vial Inject 80 Units into the skin 2 times daily (with meals)   Yes Historical Provider, MD   insulin lispro (HUMALOG) 100 UNIT/ML injection vial Inject 15 Units into the skin 3 times daily (before meals)   Yes Historical Provider, MD   metFORMIN, MOD, (GLUMETZA) 500 MG extended release tablet Take 500 mg by mouth 2 times daily (with meals)  10/3/16  Yes Historical Provider, MD   Ascorbic Acid (VITAMIN C) 250 MG CHEW Take 250 mg by mouth daily   Yes Historical Provider, MD   Glucosamine 500 MG CAPS Take 500 mg by mouth daily   Yes Historical Provider, MD   fenofibrate micronized (LOFIBRA) 134 MG capsule Take 1 capsule by mouth every morning (before breakfast) for 360 days. 6/13/12 11/19/21 Yes Alethea Araujo MD   Insulin Pen Needle (B-D ULTRAFINE III SHORT PEN) 31G X 8 MM MISC 1 each 5 times daily. 3/16/12  Yes Ko Lewis MD   aspirin 81 MG tablet Take 81 mg by mouth daily.      Yes Historical Provider, MD        Allergies:  No known allergies     Physical Examination:    Vitals:    11/19/21 0853   BP: 106/64   Pulse: 90   SpO2: 96%   Weight: 262 lb (118.8 kg)   Height: 5' 9\" (1.753 m)        Constitutional and General Appearance: no apparent distress, obese  HEENT: non-icteric sclera, mask in place  Neck: JVP less than 8 cm H20  Respiratory:  · No use of accessory muscles  · Lungs are clear throughout, no crackles/rhonchi or wheezing  Cardiovascular:  · The apical impulses not displaced  · Heart tones are distant no murmur/rub/gallop  · Regular rate and rhythm, S1,S2 normal  · Radial pulses 2+ and equal bilaterally  · Trace BLE edema  · Pedal Pulses: 2+ and equal   Abdomen:  · No masses or tenderness  · Liver: No Abnormalities Noted  Musculoskeletal/Skin:  · Exhibits normal gait balance and coordination  · There is no clubbing, cyanosis of the extremities  · Skin is warm and dry  · Moves all extremities well  Neurological/Psychiatric:  · Alert and oriented in all spheres  · No abnormalities of mood, affect, memory, mentation, or behavior are noted    Lab Data:  CBC:   Lab Results   Component Value Date    WBC 9.8 05/20/2021    WBC 10.9 05/19/2021    WBC 11.6 05/18/2021    RBC 4.53 05/20/2021    RBC 4.52 05/19/2021    RBC 4.55 05/18/2021    HGB 13.5 05/20/2021    HGB 13.2 05/19/2021    HGB 13.3 05/18/2021    HCT 40.0 05/20/2021    HCT 40.0 05/19/2021    HCT 40.6 05/18/2021    MCV 88.3 05/20/2021    MCV 88.4 05/19/2021    MCV 89.1 05/18/2021    RDW 15.5 05/20/2021    RDW 15.6 05/19/2021    RDW 15.8 05/18/2021     05/20/2021     05/19/2021     05/18/2021     Iron: No results found for: IRON, TIBC, FERRITIN  BMP:   Lab Results   Component Value Date     05/20/2021     05/19/2021     05/18/2021    K 4.4 05/20/2021    K 4.5 05/19/2021    K 5.0 05/18/2021    K 5.0 05/16/2021    K 4.5 03/14/2020     05/20/2021     05/19/2021     05/18/2021    CO2 24 05/20/2021    CO2 23 05/19/2021    CO2 25 05/18/2021    PHOS 3.3 08/11/2011    BUN 28 05/20/2021    BUN 34 05/19/2021    BUN 38 05/18/2021    CREATININE 1.1 05/20/2021    CREATININE 1.1 05/19/2021    CREATININE 1.2 05/18/2021     BNP:   Lab Results   Component Value Date    PROBNP 178 05/16/2021    PROBNP 44 03/03/2021    PROBNP 1,278 03/14/2020     Lipids:   Lab Results   Component Value Date    CHOL 154 01/19/2021        Lab Results   Component Value Date    TRIG 259 01/19/2021        Lab Results   Component Value Date    HDL 37 01/19/2021        Lab Results   Component Value Date    LDLCALC 75 01/19/2021        Lab Results   Component Value Date    LABVLDL 30 03/16/2020    VLDL 42 01/19/2021      No results found for: CHOLHDLRATIO    EF:   Lab Results   Component Value Date    LVEF 38 04/09/2021       Recent Testing:  Cardiac cath 3/15/20  Findings:               LM         Normal     LAD       Ostial 80%, mid 100%         CX         20% mid  RCA       30% mid                                               LVG       35%, anteroapical hypokinesis           LVEDP  15mmHg     Intervention:         PCI of LAD with 2.5X38 Xience overlapping with more proximal [id number]  Distal PD with 2.75NC, Mid PD with 3.5 and prox PD with 4.0 (IVUS guided)      Echo 3/16/20   Summary   The left ventricular systolic function is moderately reduced with an   ejection fraction of 35 %. Hypokinesis of apical inferior,apical   septal,apical anterior and mid anteroseptal walls. Normal left ventricular   size with mild concentric left ventricular hypertrophy.   Grade I diastolic dysfunction with normal filling pressure.   Mild thickening of leaflets of mitral valve.   No mitral stenosis.   Mild mitral regurgitation.   No intracardiac thrombus. Definity echo contrast was used. Echo 4/9/21  Summary   The left ventricular systolic function is moderately reduced with an   ejection fraction of 35-40 %. There is akinesis of the mid anteroseptal, apical septal and apical anterior   wall. There is mild concentric left ventricular hypertrophy. Grade I diastolic dysfunction with normal left ventricular filling pressure. Left ventricular cavity size is normal.Grade I diastolic dysfunction. Mild mitral annular calcification. Trivial mitral regurgitation. Trivial tricuspid regurgitation. Inadequate tricuspid regurgitation jet to estimate systolic artery pressure   (SPAP). No intracardiac thrombus with Definity echo contrast.     NYHA:   II  ACC/ AHA Stage:    C    Pertinent Problems:  ~Ischemic cardiomyopathy  systolic heart failure; heart failure with reduced ejection fraction  ~CAD; hx STEMI; s/p DEONTE x2 in LAD  ~HLD  ~HTN  ~DM  ~CKD, appears baseline creatinine 1.5     Visit Diagnosis:    1. Ischemic cardiomyopathy    2. Other fatigue    3. Abnormal CBC    4. At risk for obstructive sleep apnea      Plan:   1. Continue daily weights   2. Continue current regimen   3. Continue lasix to 20mg daily   4. Continue coreg 12.5mg twice a day   5. Entresto 49-51mg twice a day  6. Continue Aspirin and plavix   7.  Request labs from PCP and have PCP send November labs to us; need a repeat BMP and check iron levels   8. Sleep apnea referral   9. Follow up in 1 year or sooner if needed     I appreciate the opportunity for caring for this patient.      MONA Bermudez - CNP, CNP, 11/19/2021, 9:41 AM

## 2021-11-19 ENCOUNTER — OFFICE VISIT (OUTPATIENT)
Dept: CARDIOLOGY CLINIC | Age: 61
End: 2021-11-19
Payer: COMMERCIAL

## 2021-11-19 VITALS
HEIGHT: 69 IN | OXYGEN SATURATION: 96 % | WEIGHT: 262 LBS | SYSTOLIC BLOOD PRESSURE: 106 MMHG | BODY MASS INDEX: 38.8 KG/M2 | HEART RATE: 90 BPM | DIASTOLIC BLOOD PRESSURE: 64 MMHG

## 2021-11-19 DIAGNOSIS — R79.89 ABNORMAL CBC: ICD-10-CM

## 2021-11-19 DIAGNOSIS — R53.83 OTHER FATIGUE: ICD-10-CM

## 2021-11-19 DIAGNOSIS — I25.5 ISCHEMIC CARDIOMYOPATHY: Primary | ICD-10-CM

## 2021-11-19 DIAGNOSIS — Z91.89 AT RISK FOR OBSTRUCTIVE SLEEP APNEA: ICD-10-CM

## 2021-11-19 PROCEDURE — 99213 OFFICE O/P EST LOW 20 MIN: CPT | Performed by: NURSE PRACTITIONER

## 2021-11-19 NOTE — LETTER
[address]  [address] [place] [address] [address] [address]  Phone: [phone]  Fax: [phone]    MONA Mo CNP    November 19, 2021     Nicanor Luke MD  [address] [place] [address] Dr. Shannan Her [address]    Patient: Jones Le Roy   MR Number: [id number]   YOB: 1960   Date of Visit: 11/19/2021       Dear Nicanor Luke:    Thank you for referring Deonte Bejarano to me for evaluation/treatment. Below are the relevant portions of my assessment and plan of care. If you have questions, please do not hesitate to call me. I look forward to following Felipa Braun along with you.     Sincerely,      MONA Mo CNP

## 2021-11-19 NOTE — PATIENT INSTRUCTIONS
1. Continue daily weights   2. Continue current regimen   3. Continue lasix to 20mg daily   4. Continue coreg 12.5mg twice a day   5. Entresto 49-51mg twice a day  6. Continue Aspirin and plavix   7. Request labs from PCP and have PCP send November labs to us; need a repeat BMP and check iron levels   8. Sleep apnea referral   9.  Follow up in 1 year or sooner if needed

## 2022-01-23 ENCOUNTER — PATIENT MESSAGE (OUTPATIENT)
Dept: CARDIOLOGY CLINIC | Age: 62
End: 2022-01-23

## 2022-01-24 RX ORDER — ATORVASTATIN CALCIUM 80 MG/1
TABLET, FILM COATED ORAL
Qty: 90 TABLET | Refills: 3 | Status: SHIPPED | OUTPATIENT
Start: 2022-01-24

## 2022-01-24 NOTE — TELEPHONE ENCOUNTER
From: Russell Ramirez  To: Dr. Casper Grady: 1/23/2022 12:09 PM EST  Subject: CLOPIDOGREL REFILL NEEDED    75 MG.

## 2022-01-24 NOTE — TELEPHONE ENCOUNTER
From: Shawanda De Leon  To: Estee Ha  Sent: 1/23/2022 12:17 PM EST  Subject: REFILLS NEEDED PLEASE! Good Morning! I hope you are doing well. I'm still old & salty. I need the following scripts refilled please. Doing that will not only make the pharmacy rich, it will assure that I stay old & salty for a little longer. :)    ENTRESTO 49-51 MG  CARVEDILOL 12.5 MG  ATORVASTATIN 80MG  FUROSIMIDE 20MG  Thanks, have a great day!

## 2022-01-25 RX ORDER — CLOPIDOGREL BISULFATE 75 MG/1
75 TABLET ORAL DAILY
Qty: 90 TABLET | Refills: 3 | Status: SHIPPED | OUTPATIENT
Start: 2022-01-25 | End: 2022-02-04

## 2022-01-25 RX ORDER — SACUBITRIL AND VALSARTAN 49; 51 MG/1; MG/1
1 TABLET, FILM COATED ORAL 2 TIMES DAILY
Qty: 180 TABLET | Refills: 3 | Status: SHIPPED | OUTPATIENT
Start: 2022-01-25 | End: 2022-02-08 | Stop reason: DRUGHIGH

## 2022-01-25 RX ORDER — CARVEDILOL 12.5 MG/1
TABLET ORAL
Qty: 180 TABLET | Refills: 3 | Status: SHIPPED | OUTPATIENT
Start: 2022-01-25

## 2022-01-25 RX ORDER — FUROSEMIDE 20 MG/1
20 TABLET ORAL DAILY
Qty: 90 TABLET | Refills: 3 | Status: SHIPPED | OUTPATIENT
Start: 2022-01-25

## 2022-01-28 ENCOUNTER — PATIENT MESSAGE (OUTPATIENT)
Dept: CARDIOLOGY CLINIC | Age: 62
End: 2022-01-28

## 2022-01-28 DIAGNOSIS — I25.5 ISCHEMIC CARDIOMYOPATHY: Primary | ICD-10-CM

## 2022-02-03 DIAGNOSIS — I25.10 CORONARY ARTERY DISEASE INVOLVING NATIVE CORONARY ARTERY OF NATIVE HEART WITHOUT ANGINA PECTORIS: Primary | ICD-10-CM

## 2022-02-04 RX ORDER — CLOPIDOGREL BISULFATE 75 MG/1
TABLET ORAL
Qty: 90 TABLET | Refills: 3 | Status: SHIPPED | OUTPATIENT
Start: 2022-02-04

## 2022-02-07 LAB
ALBUMIN SERPL-MCNC: 4.5 G/DL
ALP BLD-CCNC: 74 U/L
ALT SERPL-CCNC: 25 U/L
ANION GAP SERPL CALCULATED.3IONS-SCNC: NORMAL MMOL/L
AST SERPL-CCNC: 21 U/L
BASOPHILS ABSOLUTE: 0 /ΜL
BASOPHILS RELATIVE PERCENT: 0 %
BILIRUB SERPL-MCNC: 0.4 MG/DL (ref 0.1–1.4)
BUN BLDV-MCNC: 31 MG/DL
CALCIUM SERPL-MCNC: 9.9 MG/DL
CHLORIDE BLD-SCNC: 102 MMOL/L
CHOLESTEROL, TOTAL: 147 MG/DL
CHOLESTEROL/HDL RATIO: NORMAL
CO2: 21 MMOL/L
CREAT SERPL-MCNC: 1.37 MG/DL
EOSINOPHILS ABSOLUTE: 0.3 /ΜL
EOSINOPHILS RELATIVE PERCENT: 3 %
ESTIMATED AVERAGE GLUCOSE: 186
GFR CALCULATED: 55
GLUCOSE BLD-MCNC: 191 MG/DL
HBA1C MFR BLD: 8.1 %
HCT VFR BLD CALC: 48.5 % (ref 41–53)
HDLC SERPL-MCNC: 38 MG/DL (ref 35–70)
HEMOGLOBIN: 16 G/DL (ref 13.5–17.5)
IRON: 85
LDL CHOLESTEROL CALCULATED: 74 MG/DL (ref 0–160)
LYMPHOCYTES ABSOLUTE: 1.5 /ΜL
LYMPHOCYTES RELATIVE PERCENT: 18 %
MCH RBC QN AUTO: 29.1 PG
MCHC RBC AUTO-ENTMCNC: 33 G/DL
MCV RBC AUTO: 88 FL
MONOCYTES ABSOLUTE: 0.6 /ΜL
MONOCYTES RELATIVE PERCENT: 7 %
NEUTROPHILS ABSOLUTE: 6.1 /ΜL
NEUTROPHILS RELATIVE PERCENT: 71 %
NONHDLC SERPL-MCNC: NORMAL MG/DL
PLATELET # BLD: 188 K/ΜL
PMV BLD AUTO: NORMAL FL
POTASSIUM SERPL-SCNC: 4.9 MMOL/L
RBC # BLD: 5.5 10^6/ΜL
SODIUM BLD-SCNC: 141 MMOL/L
TOTAL IRON BINDING CAPACITY: 311
TOTAL PROTEIN: 6.9
TRIGL SERPL-MCNC: 211 MG/DL
VLDLC SERPL CALC-MCNC: 35 MG/DL
WBC # BLD: 8.5 10^3/ML

## 2022-02-25 RX ORDER — SACUBITRIL AND VALSARTAN 49; 51 MG/1; MG/1
TABLET, FILM COATED ORAL
Qty: 180 TABLET | Refills: 2 | OUTPATIENT
Start: 2022-02-25

## 2022-04-16 ENCOUNTER — PATIENT MESSAGE (OUTPATIENT)
Dept: CARDIOLOGY CLINIC | Age: 62
End: 2022-04-16

## 2022-04-18 NOTE — TELEPHONE ENCOUNTER
From: Kamini Solorio  To: Tonia Harrison  Sent: 4/16/2022 11:03 AM EDT  Subject: REFILL NEEDED    Good Morning! I need a refill for my Entresto please.   Thanks & have a good day. :)

## 2022-07-12 RX ORDER — SACUBITRIL AND VALSARTAN 97; 103 MG/1; MG/1
TABLET, FILM COATED ORAL
Qty: 135 TABLET | Refills: 3 | Status: SHIPPED | OUTPATIENT
Start: 2022-07-12

## 2022-12-07 ENCOUNTER — PATIENT MESSAGE (OUTPATIENT)
Dept: CARDIOLOGY CLINIC | Age: 62
End: 2022-12-07

## 2022-12-21 ENCOUNTER — OFFICE VISIT (OUTPATIENT)
Dept: CARDIOLOGY CLINIC | Age: 62
End: 2022-12-21
Payer: COMMERCIAL

## 2022-12-21 VITALS
SYSTOLIC BLOOD PRESSURE: 102 MMHG | BODY MASS INDEX: 39.84 KG/M2 | DIASTOLIC BLOOD PRESSURE: 60 MMHG | HEART RATE: 92 BPM | OXYGEN SATURATION: 96 % | WEIGHT: 269 LBS | HEIGHT: 69 IN

## 2022-12-21 DIAGNOSIS — I25.10 CORONARY ARTERY DISEASE INVOLVING NATIVE CORONARY ARTERY OF NATIVE HEART WITHOUT ANGINA PECTORIS: ICD-10-CM

## 2022-12-21 DIAGNOSIS — I25.5 ISCHEMIC CARDIOMYOPATHY: Primary | ICD-10-CM

## 2022-12-21 DIAGNOSIS — I50.22 CHRONIC SYSTOLIC HEART FAILURE (HCC): ICD-10-CM

## 2022-12-21 PROCEDURE — 99214 OFFICE O/P EST MOD 30 MIN: CPT | Performed by: NURSE PRACTITIONER

## 2022-12-21 PROCEDURE — 3078F DIAST BP <80 MM HG: CPT | Performed by: NURSE PRACTITIONER

## 2022-12-21 PROCEDURE — 3074F SYST BP LT 130 MM HG: CPT | Performed by: NURSE PRACTITIONER

## 2022-12-21 RX ORDER — INSULIN ASPART 100 [IU]/ML
INJECTION, SUSPENSION SUBCUTANEOUS
COMMUNITY
Start: 2022-12-06

## 2022-12-21 RX ORDER — SACUBITRIL AND VALSARTAN 97; 103 MG/1; MG/1
1 TABLET, FILM COATED ORAL 2 TIMES DAILY
Qty: 180 TABLET | Refills: 3 | Status: SHIPPED | OUTPATIENT
Start: 2022-12-21 | End: 2022-12-21 | Stop reason: SDUPTHER

## 2022-12-21 RX ORDER — FUROSEMIDE 20 MG/1
20 TABLET ORAL SEE ADMIN INSTRUCTIONS
Qty: 90 TABLET | Refills: 3
Start: 2022-12-21

## 2022-12-21 RX ORDER — INSULIN ASPART 100 [IU]/ML
INJECTION, SOLUTION INTRAVENOUS; SUBCUTANEOUS
COMMUNITY
Start: 2022-12-06

## 2022-12-21 RX ORDER — SACUBITRIL AND VALSARTAN 97; 103 MG/1; MG/1
1 TABLET, FILM COATED ORAL 2 TIMES DAILY
Qty: 180 TABLET | Refills: 3 | Status: SHIPPED | OUTPATIENT
Start: 2022-12-21

## 2022-12-21 NOTE — PROGRESS NOTES
[address]   Cardiac Follow-up    Primary Care Doctor:  Chio Russell MD    Chief Complaint   Patient presents with    Cardiomyopathy    Hyperlipidemia    Coronary Artery Disease          History of Present Illness:   I had the pleasure of seeing Aaliyah Alford in follow up for ischemic cardiomyopathy. Admitted 3/14/20-3/16/20 with STEMI. S/p DEONTE to LAD x2. Echocardiogram shows LVEF 35-40% (2021)     Since last visit, still working. Getting some shortness of breath with over exertion. Staying active with fire department. Has a lot of joint pains. Some wheezing in the evening clears with coughing. No edema. Taking meds as prescribed. No issues with meds. No chest pains. Past Medical History:   has a past medical history of Allergic rhinitis, Arrhythmia, CHF (congestive heart failure) ([facility] CKD (chronic kidney disease) stage 3, GFR 30-59 ml/min ([facility]), Coronary artery disease involving native coronary artery of native heart with angina pectoris ([facility] Diverticulitis, Diverticulitis, Fatigue, GERD (gastroesophageal reflux disease), Hyperlipidemia, Hypertension, Microalbuminuria, Osteoarthritis, Plantar fasciitis, Testosterone deficiency, Type 2 diabetes, uncontrolled, with renal manifestation, and Vitamin D deficiency. Surgical History:   has a past surgical history that includes eye surgery; Appendectomy; and shoulder surgery (2006). Social History:   reports that he has quit smoking. He has never used smokeless tobacco. He reports that he does not drink alcohol and does not use drugs. Family History:   Family History   Problem Relation Age of Onset    Glaucoma Mother     Heart Disease Father     Heart Attack Father     Cancer Maternal Grandmother     Cancer Maternal Grandfather        Home Medications:  Prior to Admission medications    Medication Sig Start Date End Date Taking?  Authorizing Provider   NOVOLOG FLEXPEN 100 UNIT/ML injection pen  12/6/22  Yes Historical Provider, MD NOVOLOG MIX 70/30 FLEXPEN (70-30) 100 UNIT/ML injection  12/6/22  Yes Historical Provider, MD   sacubitril-valsartan (ENTRESTO)  MG per tablet TAKE 1/2 TABLET IN THE MORNING AND TAKE 1 TABLET IN THE EVENING 7/12/22  Yes MONA Cha CNP   clopidogrel (PLAVIX) 75 MG tablet TAKE 1 TABLET EVERY DAY 2/4/22  Yes Meena Meredith MD   furosemide (LASIX) 20 MG tablet Take 1 tablet by mouth daily 1/25/22  Yes MONA Cha CNP   carvedilol (COREG) 12.5 MG tablet TAKE 1 TABLET BY MOUTH  TWICE DAILY 1/25/22  Yes MONA Cha CNP   atorvastatin (LIPITOR) 80 MG tablet TAKE 1 TABLET EVERY NIGHT 1/24/22  Yes MONA Cha CNP   Dulaglutide (TRULICITY SC) Inject 1.5 pens into the skin once a week   Yes Historical Provider, MD   sertraline (ZOLOFT) 50 MG tablet Take 50 mg by mouth daily   Yes Historical Provider, MD   empagliflozin (JARDIANCE) 25 MG tablet Take 25 mg by mouth daily   Yes Historical Provider, MD   Methylcellulose, Laxative, (CITRUCEL PO) Take by mouth   Yes Historical Provider, MD   Multiple Vitamins-Minerals (THERAPEUTIC MULTIVITAMIN-MINERALS) tablet Take 1 tablet by mouth daily   Yes Historical Provider, MD   TURMERIC CURCUMIN PO Take by mouth   Yes Historical Provider, MD   insulin lispro protamine & lispro (HUMALOG MIX) (75-25) 100 UNIT per ML SUSP injection vial Inject 80 Units into the skin 2 times daily (with meals)   Yes Historical Provider, MD   insulin lispro (HUMALOG) 100 UNIT/ML injection vial Inject 15 Units into the skin 3 times daily (before meals)   Yes Historical Provider, MD   metFORMIN, MOD, (GLUMETZA) 500 MG extended release tablet Take 500 mg by mouth 2 times daily (with meals)  10/3/16  Yes Historical Provider, MD   Ascorbic Acid (VITAMIN C) 250 MG CHEW Take 250 mg by mouth daily   Yes Historical Provider, MD   Glucosamine 500 MG CAPS Take 500 mg by mouth daily   Yes Historical Provider, MD   Insulin Pen Needle (B-D ULTRAFINE III SHORT PEN) 31G X 8 MM MISC 1 each 5 times daily. 3/16/12  Yes Tuyet Cervantes MD   aspirin 81 MG tablet Take 81 mg by mouth daily. Yes Historical Provider, MD   Vitamin D (CHOLECALCIFEROL) 50 MCG (2000 UT) TABS tablet Take 1 tablet by mouth daily for 15 days 5/20/21 11/19/21  Charlie Hickey MD   fenofibrate micronized (LOFIBRA) 134 MG capsule Take 1 capsule by mouth every morning (before breakfast) for 360 days. 6/13/12 11/19/21  Raya Rodas MD        Allergies:  No known allergies     Physical Examination:    Vitals:    12/21/22 0916   BP: 102/60   Site: Right Upper Arm   Position: Sitting   Cuff Size: Large Adult   Pulse: 92   SpO2: 96%   Weight: 269 lb (122 kg)   Height: 5' 9\" (1.753 m)        Physical exam was reviewed and updated as below at the time of the visit:     Constitutional and General Appearance: no apparent distress, obese  HEENT: non-icteric sclera, mask in place  Neck: JVP less than 8 cm H20  Respiratory:  No use of accessory muscles  Lungs are clear throughout, no crackles/rhonchi or wheezing  Cardiovascular:   The apical impulses not displaced  Heart tones are distant no murmur/rub/gallop  Regular rate and rhythm, S1,S2 normal  Radial pulses 2+ and equal bilaterally  No  BLE edema  Pedal Pulses: 2+ and equal   Abdomen:  No masses or tenderness  Liver: No Abnormalities Noted  Musculoskeletal/Skin:  Exhibits normal gait balance and coordination  There is no clubbing, cyanosis of the extremities  Skin is warm and dry  Moves all extremities well  Neurological/Psychiatric:  Alert and oriented in all spheres  No abnormalities of mood, affect, memory, mentation, or behavior are noted    Lab Data:  CBC:   Lab Results   Component Value Date/Time    WBC 7.2 12/08/2022 12:00 AM    WBC 8.5 08/26/2021 12:00 AM    WBC 9.8 05/20/2021 05:53 AM    RBC 5.40 12/08/2022 12:00 AM    RBC 5.50 08/26/2021 12:00 AM    RBC 4.53 05/20/2021 05:53 AM    HGB 16.1 12/08/2022 12:00 AM    HGB 16.0 08/26/2021 12:00 AM    HGB 13.5 05/20/2021 05:53 AM    HCT 47.6 12/08/2022 12:00 AM    HCT 48.5 08/26/2021 12:00 AM    HCT 40.0 05/20/2021 05:53 AM    MCV 88 12/08/2022 12:00 AM    MCV 88 08/26/2021 12:00 AM    MCV 88.3 05/20/2021 05:53 AM    RDW 15.5 05/20/2021 05:53 AM    RDW 15.6 05/19/2021 05:28 AM    RDW 15.8 05/18/2021 06:01 AM     12/08/2022 12:00 AM     08/26/2021 12:00 AM     05/20/2021 05:53 AM     Iron:   Lab Results   Component Value Date    IRON 85 08/26/2021    TIBC 311 08/26/2021     BMP:   Lab Results   Component Value Date/Time     12/08/2022 12:00 AM     08/26/2021 12:00 AM     05/20/2021 05:53 AM    K 4.6 12/08/2022 12:00 AM    K 4.9 08/26/2021 12:00 AM    K 4.4 05/20/2021 05:53 AM    K 5.0 05/16/2021 06:16 PM    K 4.5 03/14/2020 08:57 PM     12/08/2022 12:00 AM     08/26/2021 12:00 AM     05/20/2021 05:53 AM    CO2 23 12/08/2022 12:00 AM    CO2 21 08/26/2021 12:00 AM    CO2 24 05/20/2021 05:53 AM    PHOS 3.3 08/11/2011 12:35 PM    BUN 28 12/08/2022 12:00 AM    BUN 31 08/26/2021 12:00 AM    BUN 28 05/20/2021 05:53 AM    CREATININE 1.35 12/08/2022 12:00 AM    CREATININE 1.37 08/26/2021 12:00 AM    CREATININE 1.1 05/20/2021 05:53 AM     BNP:   Lab Results   Component Value Date/Time    PROBNP 178 05/16/2021 06:16 PM    PROBNP 44 03/03/2021 03:04 PM    PROBNP 1,278 03/14/2020 08:57 PM     Lipids:   Lab Results   Component Value Date    CHOL 158 12/09/2022        Lab Results   Component Value Date    TRIG 186 12/09/2022        Lab Results   Component Value Date    HDL 39 12/09/2022        Lab Results   Component Value Date    LDLCALC 87 12/09/2022        Lab Results   Component Value Date    LABVLDL 30 03/16/2020    VLDL 32 12/09/2022      No results found for: CHOLHDLRATIO    EF:   Lab Results   Component Value Date    LVEF 38 04/09/2021       Recent Testing:  Cardiac cath 3/15/20  Findings:               LM         Normal     LAD       Ostial 80%, mid 100%         CX         20% mid  RCA       30% mid                                               LVG       35%, anteroapical hypokinesis           LVEDP  15mmHg     Intervention:         PCI of LAD with 2.5X38 Xience overlapping with more proximal 3.4M91Vlwcri  Distal PD with 2.75NC, Mid PD with 3.5 and prox PD with 4.0 (IVUS guided)      Echo 3/16/20   Summary   The left ventricular systolic function is moderately reduced with an ejection fraction of 35 %. Hypokinesis of apical inferior,apical   septal,apical anterior and mid anteroseptal walls. Normal left ventricular   size with mild concentric left ventricular hypertrophy. Grade I diastolic dysfunction with normal filling pressure. Mild thickening of leaflets of mitral valve. No mitral stenosis. Mild mitral regurgitation. No intracardiac thrombus. Definity echo contrast was used. Echo 4/9/21  Summary   The left ventricular systolic function is moderately reduced with an   ejection fraction of 35-40 %. There is akinesis of the mid anteroseptal, apical septal and apical anterior wall. There is mild concentric left ventricular hypertrophy. Grade I diastolic dysfunction with normal left ventricular filling pressure. Left ventricular cavity size is normal.Grade I diastolic dysfunction. Mild mitral annular calcification. Trivial mitral regurgitation. Trivial tricuspid regurgitation. Inadequate tricuspid regurgitation jet to estimate systolic artery pressure (SPAP). No intracardiac thrombus with Definity echo contrast.     NYHA:   II  ACC/ AHA Stage:    C    Pertinent Problems:  ~Ischemic cardiomyopathy  systolic heart failure; heart failure with reduced ejection fraction  ~CAD; hx STEMI; s/p DEONTE x2 in LAD  ~HLD  ~HTN  ~DM  ~CKD, appears baseline creatinine 1.5     Visit Diagnosis:    1. Ischemic cardiomyopathy    2. Chronic systolic heart failure ([address]    3.  Coronary artery disease involving native coronary artery of native heart without angina pectoris      Plan:   Continue daily weights   Adjust the entresto to full tab of the entresto 97/103 mg twice a day   Okay to stop the lasix and only use as needed   Continue coreg 12.5mg twice a day  Repeat echo in the spring  Follow up in 1 year or sooner if needed     I appreciate the opportunity for caring for this patient.      Elie Victor, MONA - CNP, 12/21/2022, 9:24 AM

## 2022-12-21 NOTE — PATIENT INSTRUCTIONS
Plan:   Continue daily weights   Adjust the entresto to full tab of the entresto 97/103 mg twice a day   Okay to stop the lasix and only use as needed for weight gain of 3lbs in a day or 5lbs in a week with leg swelling or worse shortness of breath   Continue coreg 12.5mg twice a day  Repeat echo in the spring  Follow up in 1 year or sooner if needed

## 2023-03-15 ENCOUNTER — PATIENT MESSAGE (OUTPATIENT)
Dept: CARDIOLOGY CLINIC | Age: 63
End: 2023-03-15

## 2023-03-15 DIAGNOSIS — I25.10 CORONARY ARTERY DISEASE INVOLVING NATIVE CORONARY ARTERY OF NATIVE HEART WITHOUT ANGINA PECTORIS: ICD-10-CM

## 2023-03-16 RX ORDER — ATORVASTATIN CALCIUM 80 MG/1
TABLET, FILM COATED ORAL
Qty: 90 TABLET | Refills: 3 | Status: SHIPPED | OUTPATIENT
Start: 2023-03-16

## 2023-03-16 RX ORDER — CLOPIDOGREL BISULFATE 75 MG/1
TABLET ORAL
Qty: 90 TABLET | Refills: 3 | Status: SHIPPED | OUTPATIENT
Start: 2023-03-16

## 2023-03-16 RX ORDER — CARVEDILOL 12.5 MG/1
TABLET ORAL
Qty: 180 TABLET | Refills: 3 | Status: SHIPPED | OUTPATIENT
Start: 2023-03-16

## 2023-03-16 RX ORDER — SACUBITRIL AND VALSARTAN 97; 103 MG/1; MG/1
1 TABLET, FILM COATED ORAL 2 TIMES DAILY
Qty: 180 TABLET | Refills: 3 | Status: SHIPPED | OUTPATIENT
Start: 2023-03-16

## 2023-09-06 ENCOUNTER — PATIENT MESSAGE (OUTPATIENT)
Dept: CARDIOLOGY CLINIC | Age: 63
End: 2023-09-06

## 2023-09-06 NOTE — TELEPHONE ENCOUNTER
From: Pérez Whitman  To: Jackie Angela  Sent: 9/6/2023 11:29 AM EDT  Subject: Muna Contreras FORMS FOR NOVARTIS    Hello:  I have forms to send to [employer] for financial assistance due to being retired and the high cost of [id number]. If I fax them to you will you fill them out (just your portion of it) and email it to me? If you're not allowed to email it can you fax it to the [place] office or snail mail it to me ? Thanks, hope you're doing well.   Yuniel Davis

## 2023-09-08 RX ORDER — SACUBITRIL AND VALSARTAN 97; 103 MG/1; MG/1
1 TABLET, FILM COATED ORAL 2 TIMES DAILY
Qty: 180 TABLET | Refills: 3 | Status: SHIPPED | OUTPATIENT
Start: 2023-09-08

## 2023-12-12 NOTE — PROGRESS NOTES
s/p DEONTE x2 in LAD  ~HLD  ~HTN  ~DM  ~CKD, appears baseline creatinine 1.5     Visit Diagnosis:    1. Ischemic cardiomyopathy    2. Chronic systolic heart failure ([address])    3. Coronary artery disease involving native coronary artery of native heart without angina pectoris    4. Stage 3 chronic kidney disease, unspecified whether stage 3a or 3b CKD ([address])      Plan:   Continue daily weights   Stay as active as possible   Continue entresto 97/103 mg twice a day   Only use lasix as needed for weight gain 3lbs in a day  Continue coreg 12.5mg twice a day  Obtain labs from PCP  Repeat EKG today -reviewed by me and  shows NSR with prior anterior infarct, no acute changes compared to prior   Repeat echocardiogram- call to schedule [id number]   Follow up in 6-9 months     I appreciate the opportunity for caring for this patient.      Terrell Augustin, MONA - NAY, 12/13/2023, 11:06 AM

## 2023-12-13 ENCOUNTER — OFFICE VISIT (OUTPATIENT)
Dept: CARDIOLOGY CLINIC | Age: 63
End: 2023-12-13

## 2023-12-13 VITALS
HEART RATE: 94 BPM | DIASTOLIC BLOOD PRESSURE: 62 MMHG | BODY MASS INDEX: 39.77 KG/M2 | SYSTOLIC BLOOD PRESSURE: 120 MMHG | OXYGEN SATURATION: 97 % | HEIGHT: 69 IN | WEIGHT: 268.5 LBS

## 2023-12-13 DIAGNOSIS — N18.30 STAGE 3 CHRONIC KIDNEY DISEASE, UNSPECIFIED WHETHER STAGE 3A OR 3B CKD (HCC): ICD-10-CM

## 2023-12-13 DIAGNOSIS — I25.10 CORONARY ARTERY DISEASE INVOLVING NATIVE CORONARY ARTERY OF NATIVE HEART WITHOUT ANGINA PECTORIS: ICD-10-CM

## 2023-12-13 DIAGNOSIS — I25.5 ISCHEMIC CARDIOMYOPATHY: Primary | ICD-10-CM

## 2023-12-13 DIAGNOSIS — I50.22 CHRONIC SYSTOLIC HEART FAILURE (HCC): ICD-10-CM

## 2023-12-13 NOTE — PATIENT INSTRUCTIONS
Plan:   Continue daily weights   Stay as active as possible   Continue entresto 97/103 mg twice a day   Only use lasix as needed for weight gain 3lbs in a day  Continue coreg 12.5mg twice a day  Obtain labs from PCP  Repeat EKG today   Repeat echocardiogram- call to schedule [phone]   Follow up 6 months

## 2023-12-26 ENCOUNTER — PATIENT MESSAGE (OUTPATIENT)
Dept: CARDIOLOGY CLINIC | Age: 63
End: 2023-12-26

## 2023-12-26 NOTE — TELEPHONE ENCOUNTER
[facility] [employer], echo can be completed at [facility].

## 2023-12-26 NOTE — TELEPHONE ENCOUNTER
From: Pérez Stapleton  To: Jackie Angela  Sent: 12/26/2023 9:25 AM EST  Subject: ECHO test    Good morning, sorry for bothering you. I can schedule the ECHO w/color test for [facility], right? I know we discussed it & I'm sorry I forgot the answer.   Ulices

## 2023-12-26 NOTE — TELEPHONE ENCOUNTER
Send My chart message. Covering for [employer], echo can be completed at [facility], [place].

## 2024-03-05 ENCOUNTER — HOSPITAL ENCOUNTER (OUTPATIENT)
Dept: NON INVASIVE DIAGNOSTICS | Age: 64
Discharge: HOME | End: 2024-03-05
Payer: COMMERCIAL

## 2024-03-05 DIAGNOSIS — I25.10 CORONARY ARTERY DISEASE INVOLVING NATIVE CORONARY ARTERY OF NATIVE HEART WITHOUT ANGINA PECTORIS: ICD-10-CM

## 2024-03-05 DIAGNOSIS — I25.5 ISCHEMIC CARDIOMYOPATHY: ICD-10-CM

## 2024-03-05 PROCEDURE — 6360000004 HC RX CONTRAST MEDICATION: Performed by: INTERNAL MEDICINE

## 2024-03-05 PROCEDURE — C8929 TTE W OR WO FOL WCON,DOPPLER: HCPCS

## 2024-03-05 RX ADMIN — PERFLUTREN 3 ML: 6.52 INJECTION, SUSPENSION INTRAVENOUS at 11:54

## 2024-03-06 ENCOUNTER — TELEPHONE (OUTPATIENT)
Dept: CARDIOLOGY CLINIC | Age: 64
End: 2024-03-06

## 2024-03-06 NOTE — TELEPHONE ENCOUNTER
----- Message from MONA Edge CNP sent at 3/6/2024 12:20 PM EST -----  Please call patient with echocardiogram results.  Echo shows heart function has improved to 35-40%.    Continue current regimen.

## 2024-03-06 NOTE — RESULT ENCOUNTER NOTE
Please call patient with echocardiogram results.  Echo shows heart function has improved to 35-40%.    Continue current regimen.

## 2024-03-06 NOTE — TELEPHONE ENCOUNTER
Created telephone encounter. Spoke with Pablo relayed message per [facility] regarding echo. Pt verbalized understanding.

## 2024-03-21 RX ORDER — CARVEDILOL 12.5 MG/1
TABLET ORAL
Qty: 180 TABLET | Refills: 3 | Status: SHIPPED | OUTPATIENT
Start: 2024-03-21

## 2024-03-21 NOTE — TELEPHONE ENCOUNTER
[facility] OOT, [facility] Covering. Rx pending sign off    LOV [facility] 12/13/2023  Next OV [facility] 8/19/2024  Labs 12/2022

## 2024-04-05 RX ORDER — ATORVASTATIN CALCIUM 80 MG/1
TABLET, FILM COATED ORAL
Qty: 90 TABLET | Refills: 2 | Status: SHIPPED | OUTPATIENT
Start: 2024-04-05

## 2024-04-12 DIAGNOSIS — I25.10 CORONARY ARTERY DISEASE INVOLVING NATIVE CORONARY ARTERY OF NATIVE HEART WITHOUT ANGINA PECTORIS: ICD-10-CM

## 2024-04-12 RX ORDER — CLOPIDOGREL BISULFATE 75 MG/1
TABLET ORAL
Qty: 90 TABLET | Refills: 0 | Status: SHIPPED | OUTPATIENT
Start: 2024-04-12

## 2024-04-12 NOTE — TELEPHONE ENCOUNTER
Last ov 12/13/2023  Upcoming ov 8/19/2024  CMP 12/08/2022  CBC 12/08/2022     [facility] OOT   [facility]DE would you like pt to get updated labs?

## 2024-04-29 ENCOUNTER — TELEPHONE (OUTPATIENT)
Dept: CARDIOLOGY CLINIC | Age: 64
End: 2024-04-29

## 2024-04-29 NOTE — TELEPHONE ENCOUNTER
Pt dropped of a new application for Entresto to be sent to [facility].    After, checking pt was already approved to receive from [facility] until 12.31.24.    I spoke with a representative @ [facility]  Confirned pt has already been approved.    They only thing needed was for the pt to send in updated care giver info so they could ad to his info.    Info faxed over today.  Conformation received.

## 2024-07-15 DIAGNOSIS — I25.10 CORONARY ARTERY DISEASE INVOLVING NATIVE CORONARY ARTERY OF NATIVE HEART WITHOUT ANGINA PECTORIS: ICD-10-CM

## 2024-07-15 RX ORDER — CLOPIDOGREL BISULFATE 75 MG/1
TABLET ORAL
Qty: 90 TABLET | Refills: 0 | Status: SHIPPED | OUTPATIENT
Start: 2024-07-15

## 2024-08-19 ENCOUNTER — OFFICE VISIT (OUTPATIENT)
Dept: CARDIOLOGY CLINIC | Age: 64
End: 2024-08-19
Payer: COMMERCIAL

## 2024-08-19 VITALS
HEART RATE: 92 BPM | WEIGHT: 271 LBS | SYSTOLIC BLOOD PRESSURE: 120 MMHG | BODY MASS INDEX: 40.14 KG/M2 | DIASTOLIC BLOOD PRESSURE: 68 MMHG | HEIGHT: 69 IN | OXYGEN SATURATION: 95 %

## 2024-08-19 DIAGNOSIS — I50.22 CHRONIC SYSTOLIC HEART FAILURE (HCC): ICD-10-CM

## 2024-08-19 DIAGNOSIS — I25.10 CORONARY ARTERY DISEASE INVOLVING NATIVE CORONARY ARTERY OF NATIVE HEART WITHOUT ANGINA PECTORIS: ICD-10-CM

## 2024-08-19 DIAGNOSIS — I25.5 ISCHEMIC CARDIOMYOPATHY: Primary | ICD-10-CM

## 2024-08-19 PROCEDURE — G8427 DOCREV CUR MEDS BY ELIG CLIN: HCPCS | Performed by: NURSE PRACTITIONER

## 2024-08-19 PROCEDURE — 3078F DIAST BP <80 MM HG: CPT | Performed by: NURSE PRACTITIONER

## 2024-08-19 PROCEDURE — 99214 OFFICE O/P EST MOD 30 MIN: CPT | Performed by: NURSE PRACTITIONER

## 2024-08-19 PROCEDURE — 1036F TOBACCO NON-USER: CPT | Performed by: NURSE PRACTITIONER

## 2024-08-19 PROCEDURE — 3017F COLORECTAL CA SCREEN DOC REV: CPT | Performed by: NURSE PRACTITIONER

## 2024-08-19 PROCEDURE — G8417 CALC BMI ABV UP PARAM F/U: HCPCS | Performed by: NURSE PRACTITIONER

## 2024-08-19 PROCEDURE — 3074F SYST BP LT 130 MM HG: CPT | Performed by: NURSE PRACTITIONER

## 2024-08-19 RX ORDER — SACUBITRIL AND VALSARTAN 97; 103 MG/1; MG/1
1 TABLET, FILM COATED ORAL 2 TIMES DAILY
Qty: 180 TABLET | Refills: 3 | Status: CANCELLED | OUTPATIENT
Start: 2024-08-19

## 2024-08-19 RX ORDER — ATORVASTATIN CALCIUM 80 MG/1
TABLET, FILM COATED ORAL
Qty: 90 TABLET | Refills: 3 | Status: SHIPPED | OUTPATIENT
Start: 2024-08-19

## 2024-08-19 RX ORDER — CLOPIDOGREL BISULFATE 75 MG/1
TABLET ORAL
Qty: 90 TABLET | Refills: 3 | Status: SHIPPED | OUTPATIENT
Start: 2024-08-19

## 2024-08-19 RX ORDER — SACUBITRIL AND VALSARTAN 97; 103 MG/1; MG/1
1 TABLET, FILM COATED ORAL 2 TIMES DAILY
Qty: 180 TABLET | Refills: 3 | Status: SHIPPED | OUTPATIENT
Start: 2024-08-19

## 2024-08-19 NOTE — PATIENT INSTRUCTIONS
Continue daily weights   Stay as active as possible - start walking every day   Continue entresto 97/103 mg twice a day   Only use lasix as needed for weight gain 3lbs in a day  Continue coreg 12.5mg twice a day  Continue Jardiance  Patient assistance papers for later this year   Follow up in 6 months

## 2024-08-19 NOTE — PROGRESS NOTES
[facility]   Cardiac Follow-up    Primary Care Doctor:  Roberto Perez MD    Chief Complaint   Patient presents with    Follow-up    Cardiomyopathy       History of Present Illness:   I had the pleasure of seeing Monroe Ortiz in follow up for ischemic cardiomyopathy.     Admitted 3/14/20-3/16/20 with STEMI. S/p DEONTE to LAD x2. Echocardiogram shows LVEF 35-40% (2021)     Since last visit, echo 3/2024 showed LVEF improved to 35-40%.   He has been doing well. Remains active. Working.   No chest pain or shortness of breath with daily activity.   Recent labs in July with PCP.   Taking meds as prescribed.     Taking medications as prescribed: Yes  Able to afford medications: Yes    Past Medical History:   has a past medical history of Allergic rhinitis, Arrhythmia, CHF (congestive heart failure) ([facility]), CKD (chronic kidney disease) stage 3, GFR 30-59 ml/min ([facility]), Coronary artery disease involving native coronary artery of native heart with angina pectoris ([facility]), Diverticulitis, Diverticulitis, Fatigue, GERD (gastroesophageal reflux disease), Hyperlipidemia, Hypertension, Microalbuminuria, Osteoarthritis, Plantar fasciitis, Testosterone deficiency, Type 2 diabetes, uncontrolled, with renal manifestation, and Vitamin D deficiency.  Surgical History:   has a past surgical history that includes eye surgery; Appendectomy; and shoulder surgery (2006).   Social History:   reports that he has quit smoking. He has never used smokeless tobacco. He reports that he does not drink alcohol and does not use drugs.   Family History:   Family History   Problem Relation Age of Onset    Glaucoma Mother     Heart Disease Father     Heart Attack Father     Cancer Maternal Grandmother     Cancer Maternal Grandfather        Home Medications:  Prior to Admission medications    Medication Sig Start Date End Date Taking? Authorizing Provider   clopidogrel (PLAVIX) 75 MG tablet TAKE ONE TABLET BY MOUTH EVERY DAY 7/15/24  Yes

## 2024-08-21 NOTE — PRE SEDATION
 heparin 25,000 units in dextrose 5% 250 mL infusion  1,000 Units/hr Intravenous Continuous Torrie Wren MD        heparin (porcine) 100 UNIT/ML infusion             sodium chloride 0.9 % infusion             heparin (porcine) injection 1,000 Units  1,000 Units Intravenous Once Conchita López MD         Current Outpatient Medications   Medication Sig Dispense Refill    Tuberculin-Allergy Syringes (B-D TB SYRINGE .5CC/27GX1/2\") 27G X 1/2\" 0.5 ML MISC by Does not apply route. 24 each 3    testosterone cypionate (DEPOTESTOTERONE CYPIONATE) 200 MG/ML injection Use 100 mg (0.5ml) once a week IM   3 mL 3    vitamin D (ERGOCALCIFEROL) 16299 UNITS CAPS capsule Take 1 capsule by mouth once a week. 12 capsule 3    fenofibrate micronized (LOFIBRA) 134 MG capsule Take 1 capsule by mouth every morning (before breakfast) for 360 days. 90 capsule 3    benazepril (LOTENSIN) 20 MG tablet Take 1 tablet by mouth daily for 360 days. 90 tablet 3    LANTUS SOLOSTAR 100 UNIT/ML injection Inject 40 Units into the skin 2 times daily. 27 Pen 3    Insulin Pen Needle (B-D ULTRAFINE III SHORT PEN) 31G X 8 MM MISC 1 each 5 times daily. 450 each 3    simvastatin (ZOCOR) 20 MG tablet Take 1 tablet by mouth nightly. 90 tablet 3    omega-3 acid ethyl esters (LOVAZA) 1 G capsule Take 2 capsules by mouth 2 times daily. 360 capsule 3    NOVOLOG FLEXPEN 100 UNIT/ML injection Use up to 60 units SQ in divided doses 15 Pen 3    L-Methylfolate-V65-V3-Z0 (CEREFOLIN) 6-1-50-5 MG TABS Take 1 tablet by mouth 3 times daily (with meals). 270 tablet 3    fluticasone (FLONASE) 50 MCG/ACT nasal spray 2 sprays by Nasal route as needed.  hydrocodone-acetaminophen (VICODIN) 5-500 MG per tablet Take 1 tablet by mouth as needed.  aspirin 81 MG tablet Take 81 mg by mouth daily. Anti-Anginal Meds (2wks):  ANTI-ANGINAL MEDS: Yes: Aspirin and Statin (Any)    Pre-Sedation Assessment     Vital Signs:   BP (!) 153/82   Pulse 111 Temp 98.3 °F (36.8 °C) (Oral)   Resp 18   Wt 250 lb (113.4 kg)   SpO2 97%   BMI 38.46 kg/m²   Pre-Sedation Exam:  I have assessed the patient and agree with the H&P present on the chart. Hx of Anesthesia Comps:  none  Modified Mallampati: II (soft palate, uvula, fauces visible)  ASA Classification:  Class 4 - A patient with an incapacitating systemic disease that is a constant threat to life and Class 2 -- A normal healthy patient with mild systemic disease  Caitlin Scale: Activity:  2 - Able to move 4 extremities voluntarily on command  Respiration:  2 - Able to breathe deeply and cough freely  Circulation:  2 - BP+/- 20mmHg of normal  Consciousness:  2 - Fully awake  Oxygen Saturation (color):  2 - Able to maintain oxygen saturation >92% on room air    Sedation Plan     Goals:  Guard the patient's safety/welfare, minimize physical discomfort/pain,   minimize negative psychological responses to treatment by providing sedation/ analgesia and maximize the potential amnesia. Patient to meet  pre-procedure discharge plan.   Meds Planned: midazolam intravenously, fentanyl intravenously and midazolam intravenously, fentanyl intravenously  Sedation Candidacy: Patient is an appropriate candidate for plan of sedation: yes    Electronic Signature     Jesusita Jain MD on 3/14/2020 at 9:34 PM Destiny Carroll)

## 2024-08-22 LAB
ALBUMIN: 4.3 G/DL
ALP BLD-CCNC: 68 U/L
ALT SERPL-CCNC: 23 U/L
ANION GAP SERPL CALCULATED.3IONS-SCNC: NORMAL MMOL/L
AST SERPL-CCNC: 18 U/L
BASOPHILS ABSOLUTE: NORMAL
BASOPHILS RELATIVE PERCENT: NORMAL
BILIRUB SERPL-MCNC: 0.5 MG/DL (ref 0.1–1.4)
BUN BLDV-MCNC: 24 MG/DL
CALCIUM SERPL-MCNC: 9.1 MG/DL
CHLORIDE BLD-SCNC: 104 MMOL/L
CHOLESTEROL, TOTAL: 155 MG/DL
CHOLESTEROL/HDL RATIO: NORMAL
CO2: 20 MMOL/L
CREAT SERPL-MCNC: 1.45 MG/DL
EOSINOPHILS ABSOLUTE: NORMAL
EOSINOPHILS RELATIVE PERCENT: NORMAL
ESTIMATED AVERAGE GLUCOSE: NORMAL
GFR, ESTIMATED: 54
GLUCOSE BLD-MCNC: 136 MG/DL
HBA1C MFR BLD: 7.6 %
HCT VFR BLD CALC: 47.4 % (ref 41–53)
HDLC SERPL-MCNC: 38 MG/DL (ref 35–70)
HEMOGLOBIN: 15.4 G/DL (ref 13.5–17.5)
LDL CHOLESTEROL: 84
LYMPHOCYTES ABSOLUTE: NORMAL
LYMPHOCYTES RELATIVE PERCENT: NORMAL
MCH RBC QN AUTO: 29.6 PG
MCHC RBC AUTO-ENTMCNC: 32.5 G/DL
MCV RBC AUTO: 91 FL
MONOCYTES ABSOLUTE: NORMAL
MONOCYTES RELATIVE PERCENT: NORMAL
NEUTROPHILS ABSOLUTE: NORMAL
NEUTROPHILS RELATIVE PERCENT: NORMAL
NONHDLC SERPL-MCNC: NORMAL MG/DL
PLATELET # BLD: 174 K/ΜL
PMV BLD AUTO: NORMAL FL
POTASSIUM SERPL-SCNC: 4.9 MMOL/L
RBC # BLD: 5.21 10^6/ΜL
SODIUM BLD-SCNC: 141 MMOL/L
TOTAL PROTEIN: 6.5 G/DL (ref 6.4–8.2)
TRIGL SERPL-MCNC: 192 MG/DL
TSH SERPL DL<=0.05 MIU/L-ACNC: 1.2 UIU/ML
VLDLC SERPL CALC-MCNC: 33 MG/DL
WBC # BLD: 7.4 10^3/ML

## 2025-01-03 ENCOUNTER — PATIENT MESSAGE (OUTPATIENT)
Dept: CARDIOLOGY CLINIC | Age: 65
End: 2025-01-03

## 2025-01-06 RX ORDER — SACUBITRIL AND VALSARTAN 97; 103 MG/1; MG/1
1 TABLET, FILM COATED ORAL 2 TIMES DAILY
Qty: 180 TABLET | Refills: 0 | Status: SHIPPED | OUTPATIENT
Start: 2025-01-06

## 2025-02-26 NOTE — PROGRESS NOTES
CARDIOLOGY CONSULTATION        Patient Name: Monroe Ortiz  Primary Care physician: Roberto Perez MD    Reason for Referral/Chief Complaint: Monroe Ortiz is a 64 y.o. patient who presents to cardiology clinic today for evaluation and treatment of CAD and ischemic cardiomyopathy.     History of Present Illness:   Monroe Ortiz 64 y.o. male with a medical history notable for CAD with history of STEMI (PCI LAD 3/2020),  Hyperlipidemia, hypertension, ischemic cardiomyopathy with moderate LV dysfunction, chronic systolic heart failure, Diabetes mellitus type 2, presenting today to [facility] care.      Patient previously followed by Dr. Daniel.  Has been following NP Nabila Alberto.  Admitted 3/14/20-3/16/20 with STEMI. S/p DEONTE to LAD x2. Echocardiogram shows LVEF 35-40% (2021).  Most recent Echo 3/2024 showed LVEF improved to 35-40%.   Last office visit 8/19/24 with MERISSA Jensen, no changes made.     Today he reports feeling good.  The patient is compliant with medications.  Cost of medications is affordable.  No endorsed side effects.  Has not taken his Lasix in some time.  Does not usually weigh daily.  Takes on occasion when his finger swell.  He has been a  for 49 years.  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  States his shortness of breath hinders him with some of the more exertional parts of the job, but is able to help and do what he can.  He stable shortness of breath over time that is not progressive per his history today.  He will have some tingling in his chest when he gets short of breath on his walks.  He will stop and rest, take a few deep breaths, and continue.  Otherwise he denies angina.  He takes daily walks for exercise along his driveway at home. Patient denies palpitations, dizziness or syncope.  Complains of daytime fatigue.  He does not believe he snores.  Has never been tested for sleep apnea.    Non smoker    Past Medical History:   has a past medical history of Allergic rhinitis,

## 2025-02-28 ENCOUNTER — OFFICE VISIT (OUTPATIENT)
Dept: CARDIOLOGY CLINIC | Age: 65
End: 2025-02-28
Payer: COMMERCIAL

## 2025-02-28 VITALS
OXYGEN SATURATION: 94 % | HEART RATE: 89 BPM | HEIGHT: 69 IN | WEIGHT: 263 LBS | DIASTOLIC BLOOD PRESSURE: 66 MMHG | BODY MASS INDEX: 38.95 KG/M2 | SYSTOLIC BLOOD PRESSURE: 110 MMHG

## 2025-02-28 DIAGNOSIS — I25.10 CORONARY ARTERY DISEASE INVOLVING NATIVE CORONARY ARTERY OF NATIVE HEART WITHOUT ANGINA PECTORIS: ICD-10-CM

## 2025-02-28 DIAGNOSIS — R06.09 DYSPNEA ON EXERTION: ICD-10-CM

## 2025-02-28 DIAGNOSIS — R06.02 SOB (SHORTNESS OF BREATH): ICD-10-CM

## 2025-02-28 DIAGNOSIS — E78.2 MIXED HYPERLIPIDEMIA: ICD-10-CM

## 2025-02-28 DIAGNOSIS — I10 ESSENTIAL HYPERTENSION: ICD-10-CM

## 2025-02-28 DIAGNOSIS — I25.5 ISCHEMIC CARDIOMYOPATHY: Primary | ICD-10-CM

## 2025-02-28 DIAGNOSIS — I21.01 ST ELEVATION MYOCARDIAL INFARCTION INVOLVING LEFT MAIN CORONARY ARTERY (HCC): ICD-10-CM

## 2025-02-28 DIAGNOSIS — I50.22 CHRONIC SYSTOLIC CONGESTIVE HEART FAILURE (HCC): ICD-10-CM

## 2025-02-28 PROCEDURE — 3074F SYST BP LT 130 MM HG: CPT | Performed by: INTERNAL MEDICINE

## 2025-02-28 PROCEDURE — 93000 ELECTROCARDIOGRAM COMPLETE: CPT | Performed by: INTERNAL MEDICINE

## 2025-02-28 PROCEDURE — 3078F DIAST BP <80 MM HG: CPT | Performed by: INTERNAL MEDICINE

## 2025-02-28 PROCEDURE — 99204 OFFICE O/P NEW MOD 45 MIN: CPT | Performed by: INTERNAL MEDICINE

## 2025-02-28 RX ORDER — FENOFIBRATE 160 MG/1
160 TABLET ORAL DAILY
Qty: 90 TABLET | Refills: 2 | Status: SHIPPED | OUTPATIENT
Start: 2025-02-28

## 2025-02-28 NOTE — PATIENT INSTRUCTIONS
PLAN:  Recommend that you weigh every morning.  Same time, same amount of clothes.  Keep a log of the weights.  Bring in to your next visit with MERISSA Jensen.   If you gain 3 lbs over night or 5 lbs in a week take a Lasix.   2.    Recommend increasing the fenofibrate to 160 mg daily.  New Rx will be sent to your pharmacy.    3.    Continue all other medications as prescribed   4.    No cardiac testing warranted at this time   5.    Fasting Labs in 8 weeks - Lipids and Hepatic    We will call you with the results

## 2025-03-04 ENCOUNTER — PATIENT MESSAGE (OUTPATIENT)
Dept: CARDIOLOGY CLINIC | Age: 65
End: 2025-03-04

## 2025-03-24 RX ORDER — CARVEDILOL 12.5 MG/1
12.5 TABLET ORAL 2 TIMES DAILY
Qty: 180 TABLET | Refills: 3 | Status: SHIPPED | OUTPATIENT
Start: 2025-03-24

## 2025-04-14 RX ORDER — SACUBITRIL AND VALSARTAN 97; 103 MG/1; MG/1
TABLET, FILM COATED ORAL
Qty: 180 TABLET | Refills: 0 | Status: SHIPPED | OUTPATIENT
Start: 2025-04-14

## 2025-04-14 NOTE — TELEPHONE ENCOUNTER
Last Office Visit: 8/19/2024 Provider: SANGITA    Next Office Visit: 8/20/2025 Provider: SANGITA    Lab orders needed? N/A - labs ordered by LAUREN lipid/LFT in February. Will need CMP for next refill but OK until July.  Encounter provider correct? Yes If not, change provider  Script changes since last refill? no    LAST LABS:   BMP:   Lab Results   Component Value Date/Time     07/09/2024 12:00 AM    K 4.9 07/09/2024 12:00 AM    K 5.0 05/16/2021 06:16 PM     07/09/2024 12:00 AM    CO2 20 07/09/2024 12:00 AM    BUN 24 07/09/2024 12:00 AM    CREATININE 1.45 07/09/2024 12:00 AM    GLUCOSE 136 07/09/2024 12:00 AM    GLUCOSE 191 03/16/2012 11:06 AM    CALCIUM 9.1 07/09/2024 12:00 AM    LABGLOM 54 07/09/2024 12:00 AM

## 2025-07-21 RX ORDER — SACUBITRIL AND VALSARTAN 97; 103 MG/1; MG/1
TABLET, FILM COATED ORAL
Qty: 180 TABLET | Refills: 0 | Status: SHIPPED | OUTPATIENT
Start: 2025-07-21

## 2025-07-21 NOTE — TELEPHONE ENCOUNTER
Last Office Visit:02/28/2025 Provider: LAUREN  **Is provider OOT? Yes    Next Office Visit: 8/20/2025 Provider: SANGITA  **If no OV, when does pt need to be seen?   LAST LABS:   BMP:  Lab Results   Component Value Date/Time     07/09/2024 12:00 AM    K 4.9 07/09/2024 12:00 AM    K 5.0 05/16/2021 06:16 PM     07/09/2024 12:00 AM    CO2 20 07/09/2024 12:00 AM    BUN 24 07/09/2024 12:00 AM    CREATININE 1.45 07/09/2024 12:00 AM    GLUCOSE 136 07/09/2024 12:00 AM    GLUCOSE 191 03/16/2012 11:06 AM    CALCIUM 9.1 07/09/2024 12:00 AM    LABGLOM 54 07/09/2024 12:00 AM      BNP:  Lab Results   Component Value Date    BNP 73.8 04/23/2020

## 2025-08-20 ENCOUNTER — OFFICE VISIT (OUTPATIENT)
Dept: CARDIOLOGY CLINIC | Age: 65
End: 2025-08-20
Payer: COMMERCIAL

## 2025-08-20 VITALS
HEART RATE: 95 BPM | DIASTOLIC BLOOD PRESSURE: 60 MMHG | BODY MASS INDEX: 38.95 KG/M2 | SYSTOLIC BLOOD PRESSURE: 94 MMHG | HEIGHT: 69 IN | WEIGHT: 263 LBS | OXYGEN SATURATION: 96 %

## 2025-08-20 DIAGNOSIS — I50.22 CHRONIC SYSTOLIC CONGESTIVE HEART FAILURE (HCC): Primary | ICD-10-CM

## 2025-08-20 DIAGNOSIS — N18.30 STAGE 3 CHRONIC KIDNEY DISEASE, UNSPECIFIED WHETHER STAGE 3A OR 3B CKD (HCC): ICD-10-CM

## 2025-08-20 DIAGNOSIS — I25.5 ISCHEMIC CARDIOMYOPATHY: ICD-10-CM

## 2025-08-20 DIAGNOSIS — I25.118 CORONARY ARTERY DISEASE OF NATIVE ARTERY OF NATIVE HEART WITH STABLE ANGINA PECTORIS: ICD-10-CM

## 2025-08-20 DIAGNOSIS — I10 ESSENTIAL HYPERTENSION: ICD-10-CM

## 2025-08-20 PROCEDURE — 99214 OFFICE O/P EST MOD 30 MIN: CPT | Performed by: NURSE PRACTITIONER

## 2025-08-20 PROCEDURE — G2211 COMPLEX E/M VISIT ADD ON: HCPCS | Performed by: NURSE PRACTITIONER

## 2025-08-20 PROCEDURE — 3074F SYST BP LT 130 MM HG: CPT | Performed by: NURSE PRACTITIONER

## 2025-08-20 PROCEDURE — 3078F DIAST BP <80 MM HG: CPT | Performed by: NURSE PRACTITIONER

## 2025-08-20 RX ORDER — TIRZEPATIDE 10 MG/.5ML
INJECTION, SOLUTION SUBCUTANEOUS
COMMUNITY
Start: 2025-08-18

## 2025-08-20 RX ORDER — CLOPIDOGREL BISULFATE 75 MG/1
TABLET ORAL
Qty: 90 TABLET | Refills: 3 | Status: SHIPPED | OUTPATIENT
Start: 2025-08-20

## 2025-08-20 RX ORDER — ATORVASTATIN CALCIUM 80 MG/1
TABLET, FILM COATED ORAL
Qty: 90 TABLET | Refills: 3 | Status: SHIPPED | OUTPATIENT
Start: 2025-08-20

## 2025-08-20 RX ORDER — KRILL/OM-3/DHA/EPA/PHOSPHO/AST 500-110 MG
500 CAPSULE ORAL DAILY
COMMUNITY

## 2025-08-20 RX ORDER — INSULIN LISPRO 100 [IU]/ML
INJECTION, SOLUTION INTRAVENOUS; SUBCUTANEOUS
COMMUNITY
Start: 2025-07-14

## 2025-08-20 RX ORDER — INSULIN LISPRO 100 [IU]/ML
INJECTION, SUSPENSION SUBCUTANEOUS 2 TIMES DAILY WITH MEALS
COMMUNITY
Start: 2025-05-12

## 2025-08-20 RX ORDER — SACUBITRIL AND VALSARTAN 97; 103 MG/1; MG/1
1 TABLET ORAL 2 TIMES DAILY
Qty: 180 TABLET | Refills: 3 | Status: SHIPPED | OUTPATIENT
Start: 2025-08-20

## 2025-08-21 ENCOUNTER — TELEPHONE (OUTPATIENT)
Dept: CARDIOLOGY CLINIC | Age: 65
End: 2025-08-21

## 2025-08-21 LAB
ALBUMIN: 4.4 G/DL
ALP BLD-CCNC: 42 U/L
ALT SERPL-CCNC: 23 U/L
ANION GAP SERPL CALCULATED.3IONS-SCNC: NORMAL MMOL/L
AST SERPL-CCNC: 21 U/L
BASOPHILS ABSOLUTE: NORMAL
BASOPHILS RELATIVE PERCENT: NORMAL
BILIRUB SERPL-MCNC: 0.4 MG/DL (ref 0.1–1.4)
BUN BLDV-MCNC: 32 MG/DL
CALCIUM SERPL-MCNC: 9.4 MG/DL
CHLORIDE BLD-SCNC: 104 MMOL/L
CO2: 20 MMOL/L
CREAT SERPL-MCNC: 1.85 MG/DL
EOSINOPHILS ABSOLUTE: NORMAL
EOSINOPHILS RELATIVE PERCENT: NORMAL
ESTIMATED AVERAGE GLUCOSE: 194
GFR, ESTIMATED: 40
GLUCOSE BLD-MCNC: 194 MG/DL
HBA1C MFR BLD: 8.4 %
HCT VFR BLD CALC: 47.1 % (ref 41–53)
HEMOGLOBIN: 15.4 G/DL (ref 13.5–17.5)
LYMPHOCYTES ABSOLUTE: 1.4 /ΜL
LYMPHOCYTES RELATIVE PERCENT: 19 %
MCH RBC QN AUTO: 29.6 PG
MCHC RBC AUTO-ENTMCNC: 32.7 G/DL
MCV RBC AUTO: 91 FL
MONOCYTES ABSOLUTE: 0.5 /ΜL
MONOCYTES RELATIVE PERCENT: 8 %
NEUTROPHILS ABSOLUTE: 4.9 /ΜL
NEUTROPHILS RELATIVE PERCENT: 70 %
PLATELET # BLD: 165 K/ΜL
PMV BLD AUTO: NORMAL FL
POTASSIUM SERPL-SCNC: 5.1 MMOL/L
RBC # BLD: 5.2 10^6/ΜL
SODIUM BLD-SCNC: 141 MMOL/L
TOTAL PROTEIN: 6.6 G/DL (ref 6.4–8.2)
WBC # BLD: 7 10^3/ML